# Patient Record
Sex: FEMALE | Race: WHITE
[De-identification: names, ages, dates, MRNs, and addresses within clinical notes are randomized per-mention and may not be internally consistent; named-entity substitution may affect disease eponyms.]

---

## 2018-04-14 ENCOUNTER — HOSPITAL ENCOUNTER (INPATIENT)
Dept: HOSPITAL 17 - NEPE | Age: 63
LOS: 2 days | Discharge: HOME | DRG: 419 | End: 2018-04-16
Attending: HOSPITALIST | Admitting: HOSPITALIST
Payer: COMMERCIAL

## 2018-04-14 VITALS — HEIGHT: 63 IN | BODY MASS INDEX: 33.59 KG/M2 | WEIGHT: 189.6 LBS

## 2018-04-14 VITALS — RESPIRATION RATE: 18 BRPM | HEART RATE: 81 BPM | OXYGEN SATURATION: 99 %

## 2018-04-14 VITALS
SYSTOLIC BLOOD PRESSURE: 121 MMHG | HEART RATE: 85 BPM | OXYGEN SATURATION: 99 % | TEMPERATURE: 98.5 F | DIASTOLIC BLOOD PRESSURE: 77 MMHG | RESPIRATION RATE: 20 BRPM

## 2018-04-14 DIAGNOSIS — R73.9: ICD-10-CM

## 2018-04-14 DIAGNOSIS — K80.12: Primary | ICD-10-CM

## 2018-04-14 DIAGNOSIS — F17.210: ICD-10-CM

## 2018-04-14 LAB
ALBUMIN SERPL-MCNC: 3.5 GM/DL (ref 3.4–5)
ALP SERPL-CCNC: 81 U/L (ref 45–117)
ALT SERPL-CCNC: 17 U/L (ref 10–53)
AST SERPL-CCNC: 20 U/L (ref 15–37)
BACTERIA #/AREA URNS HPF: (no result) /HPF
BASOPHILS # BLD AUTO: 0.1 TH/MM3 (ref 0–0.2)
BASOPHILS NFR BLD: 0.5 % (ref 0–2)
BILIRUB SERPL-MCNC: 0.4 MG/DL (ref 0.2–1)
BUN SERPL-MCNC: 9 MG/DL (ref 7–18)
CALCIUM SERPL-MCNC: 9.6 MG/DL (ref 8.5–10.1)
CHLORIDE SERPL-SCNC: 106 MEQ/L (ref 98–107)
COLOR UR: YELLOW
CREAT SERPL-MCNC: 0.89 MG/DL (ref 0.5–1)
EOSINOPHIL # BLD: 0.5 TH/MM3 (ref 0–0.4)
EOSINOPHIL NFR BLD: 5.3 % (ref 0–4)
ERYTHROCYTE [DISTWIDTH] IN BLOOD BY AUTOMATED COUNT: 14.5 % (ref 11.6–17.2)
GFR SERPLBLD BASED ON 1.73 SQ M-ARVRAT: 65 ML/MIN (ref 89–?)
GLUCOSE SERPL-MCNC: 95 MG/DL (ref 74–106)
GLUCOSE UR STRIP-MCNC: (no result) MG/DL
HCO3 BLD-SCNC: 24.7 MEQ/L (ref 21–32)
HCT VFR BLD CALC: 41.5 % (ref 35–46)
HGB BLD-MCNC: 14.3 GM/DL (ref 11.6–15.3)
HGB UR QL STRIP: (no result)
KETONES UR STRIP-MCNC: 10 MG/DL
LYMPHOCYTES # BLD AUTO: 2.2 TH/MM3 (ref 1–4.8)
LYMPHOCYTES NFR BLD AUTO: 21.4 % (ref 9–44)
MCH RBC QN AUTO: 30.6 PG (ref 27–34)
MCHC RBC AUTO-ENTMCNC: 34.6 % (ref 32–36)
MCV RBC AUTO: 88.5 FL (ref 80–100)
MONOCYTE #: 0.8 TH/MM3 (ref 0–0.9)
MONOCYTES NFR BLD: 8.1 % (ref 0–8)
MUCOUS THREADS #/AREA URNS LPF: (no result) /LPF
NEUTROPHILS # BLD AUTO: 6.7 TH/MM3 (ref 1.8–7.7)
NEUTROPHILS NFR BLD AUTO: 64.7 % (ref 16–70)
NITRITE UR QL STRIP: (no result)
PLATELET # BLD: 282 TH/MM3 (ref 150–450)
PMV BLD AUTO: 8.8 FL (ref 7–11)
PROT SERPL-MCNC: 7.7 GM/DL (ref 6.4–8.2)
RBC # BLD AUTO: 4.69 MIL/MM3 (ref 4–5.3)
SODIUM SERPL-SCNC: 138 MEQ/L (ref 136–145)
SP GR UR STRIP: 1.01 (ref 1–1.03)
SQUAMOUS #/AREA URNS HPF: 1 /HPF (ref 0–5)
TROPONIN I SERPL-MCNC: (no result) NG/ML (ref 0.02–0.05)
URINE LEUKOCYTE ESTERASE: (no result)
WBC # BLD AUTO: 10.3 TH/MM3 (ref 4–11)

## 2018-04-14 PROCEDURE — 85025 COMPLETE CBC W/AUTO DIFF WBC: CPT

## 2018-04-14 PROCEDURE — 76705 ECHO EXAM OF ABDOMEN: CPT

## 2018-04-14 PROCEDURE — 84484 ASSAY OF TROPONIN QUANT: CPT

## 2018-04-14 PROCEDURE — 82550 ASSAY OF CK (CPK): CPT

## 2018-04-14 PROCEDURE — 96375 TX/PRO/DX INJ NEW DRUG ADDON: CPT

## 2018-04-14 PROCEDURE — 83605 ASSAY OF LACTIC ACID: CPT

## 2018-04-14 PROCEDURE — 71275 CT ANGIOGRAPHY CHEST: CPT

## 2018-04-14 PROCEDURE — 96374 THER/PROPH/DIAG INJ IV PUSH: CPT

## 2018-04-14 PROCEDURE — 83690 ASSAY OF LIPASE: CPT

## 2018-04-14 PROCEDURE — 93005 ELECTROCARDIOGRAM TRACING: CPT

## 2018-04-14 PROCEDURE — 88304 TISSUE EXAM BY PATHOLOGIST: CPT

## 2018-04-14 PROCEDURE — 81001 URINALYSIS AUTO W/SCOPE: CPT

## 2018-04-14 PROCEDURE — 96361 HYDRATE IV INFUSION ADD-ON: CPT

## 2018-04-14 PROCEDURE — 71045 X-RAY EXAM CHEST 1 VIEW: CPT

## 2018-04-14 PROCEDURE — 74174 CTA ABD&PLVS W/CONTRAST: CPT

## 2018-04-14 PROCEDURE — 80053 COMPREHEN METABOLIC PANEL: CPT

## 2018-04-14 NOTE — RADRPT
EXAM DATE/TIME:  04/14/2018 20:50 

 

[facility] COMPARISON:     

No previous studies available for comparison.

 

                     

INDICATIONS :     

Lower chest pain and vomiting.

                     

 

MEDICAL HISTORY :     

None.          

 

SURGICAL HISTORY :     

None.   

 

ENCOUNTER:     

Initial                                        

 

ACUITY:     

3 days      

 

PAIN SCORE:     

10/10

 

LOCATION:     

Bilateral chest 

 

FINDINGS:     

A single view of the chest demonstrates the lungs to be symmetrically aerated without evidence of mas
s, infiltrate or effusion.  The cardiomediastinal contours are unremarkable.  Osseous structures are 
intact.

 

CONCLUSION:     

No evidence of acute cardiopulmonary disease.

 

 

 

 Sang Dick MD on April 14, 2018 at 21:07           

Board Certified Radiologist.

 This report was verified electronically.
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
## 2018-04-15 VITALS
TEMPERATURE: 98 F | HEART RATE: 64 BPM | SYSTOLIC BLOOD PRESSURE: 102 MMHG | OXYGEN SATURATION: 98 % | DIASTOLIC BLOOD PRESSURE: 63 MMHG | RESPIRATION RATE: 18 BRPM

## 2018-04-15 VITALS
TEMPERATURE: 97.6 F | DIASTOLIC BLOOD PRESSURE: 68 MMHG | SYSTOLIC BLOOD PRESSURE: 108 MMHG | HEART RATE: 76 BPM | RESPIRATION RATE: 20 BRPM | OXYGEN SATURATION: 98 %

## 2018-04-15 VITALS
RESPIRATION RATE: 17 BRPM | HEART RATE: 83 BPM | TEMPERATURE: 97.4 F | SYSTOLIC BLOOD PRESSURE: 124 MMHG | OXYGEN SATURATION: 96 % | DIASTOLIC BLOOD PRESSURE: 64 MMHG

## 2018-04-15 VITALS
OXYGEN SATURATION: 95 % | SYSTOLIC BLOOD PRESSURE: 115 MMHG | TEMPERATURE: 97.6 F | HEART RATE: 62 BPM | DIASTOLIC BLOOD PRESSURE: 59 MMHG | RESPIRATION RATE: 17 BRPM

## 2018-04-15 PROCEDURE — 0FT44ZZ RESECTION OF GALLBLADDER, PERCUTANEOUS ENDOSCOPIC APPROACH: ICD-10-PCS | Performed by: SURGERY

## 2018-04-15 RX ADMIN — HYDROCODONE BITARTRATE AND ACETAMINOPHEN PRN TAB: 5; 325 TABLET ORAL at 19:17

## 2018-04-15 RX ADMIN — TAZOBACTAM SODIUM AND PIPERACILLIN SODIUM SCH MLS/HR: 500; 4 INJECTION, SOLUTION INTRAVENOUS at 02:12

## 2018-04-15 RX ADMIN — OXYTOCIN SCH MLS/HR: 10 INJECTION, SOLUTION INTRAMUSCULAR; INTRAVENOUS at 08:33

## 2018-04-15 RX ADMIN — Medication SCH ML: at 08:30

## 2018-04-15 RX ADMIN — TAZOBACTAM SODIUM AND PIPERACILLIN SODIUM SCH MLS/HR: 500; 4 INJECTION, SOLUTION INTRAVENOUS at 19:16

## 2018-04-15 RX ADMIN — HYDROCODONE BITARTRATE AND ACETAMINOPHEN PRN TAB: 5; 325 TABLET ORAL at 23:09

## 2018-04-15 RX ADMIN — TAZOBACTAM SODIUM AND PIPERACILLIN SODIUM SCH MLS/HR: 500; 4 INJECTION, SOLUTION INTRAVENOUS at 14:48

## 2018-04-15 RX ADMIN — STANDARDIZED SENNA CONCENTRATE AND DOCUSATE SODIUM SCH TAB: 8.6; 5 TABLET, FILM COATED ORAL at 08:31

## 2018-04-15 RX ADMIN — ONDANSETRON PRN MG: 2 INJECTION, SOLUTION INTRAMUSCULAR; INTRAVENOUS at 08:34

## 2018-04-15 RX ADMIN — HYDROMORPHONE HYDROCHLORIDE PRN MG: 2 INJECTION INTRAMUSCULAR; INTRAVENOUS; SUBCUTANEOUS at 02:48

## 2018-04-15 RX ADMIN — HYDROMORPHONE HYDROCHLORIDE PRN MG: 2 INJECTION INTRAMUSCULAR; INTRAVENOUS; SUBCUTANEOUS at 08:33

## 2018-04-15 RX ADMIN — ONDANSETRON PRN MG: 2 INJECTION, SOLUTION INTRAMUSCULAR; INTRAVENOUS at 02:47

## 2018-04-15 RX ADMIN — Medication SCH ML: at 19:17

## 2018-04-15 RX ADMIN — TAZOBACTAM SODIUM AND PIPERACILLIN SODIUM SCH MLS/HR: 500; 4 INJECTION, SOLUTION INTRAVENOUS at 08:33

## 2018-04-15 RX ADMIN — ONDANSETRON PRN MG: 2 INJECTION, SOLUTION INTRAMUSCULAR; INTRAVENOUS at 16:19

## 2018-04-15 RX ADMIN — STANDARDIZED SENNA CONCENTRATE AND DOCUSATE SODIUM SCH TAB: 8.6; 5 TABLET, FILM COATED ORAL at 19:17

## 2018-04-15 RX ADMIN — HYDROMORPHONE HYDROCHLORIDE PRN MG: 2 INJECTION INTRAMUSCULAR; INTRAVENOUS; SUBCUTANEOUS at 16:19

## 2018-04-15 NOTE — HHI.PR
__________________________________________________





Immediate Post Op Note


Procedure Date:


Apr 15, 2018


Pre Op Diagnosis:  


acute cholecystitis, symptomatic cholelithiasis


Post Op Diagnosis:  


same


Surgeon:


Horacio Burt MD


Assistant(s):


see or sheet


Procedure:


lap adrián


Findings:


inflamed distended gallbladder with stones


Complications:


none


Specimen(s) removed:


gallbladder


Estimated blood loss:


5cc


Anesthesia:  General


Drains:  None


Patient to:  PACU


Patient Condition:  Good











Horacio Burt MD Apr 15, 2018 10:32

## 2018-04-15 NOTE — HHI.HP
__________________________________________________





HPI


Service


[facility]


Primary Care Physician


Jeremy Skinner D.O.


Admission Diagnosis





abd pain, cholecystitis


Diagnoses:  


(1) Cholecystitis


Diagnosis:  Principal





(2) Intractable pain


Diagnosis:  Principal





(3) Tobacco abuse


Diagnosis:  Principal





Travel History


International Travel<30 Days:  No


Contact w/Intl Traveler <30 Da:  No


Traveled to Known Affected Are:  No


History of Present Illness


This is a 62-year-old female with no significant PMH who presented to the ER 

with complaints of abdominal pain in addition to nausea and vomiting x4 days.  

States symptoms have gotten progressively worse, now w/ few episodes of 

diarrhea.  Reports pain initially intermittent, now constant, severe, 9/10, 

epigastric/RUQ, non-radiating.  No h/o similar symptoms.  Denies fever or 

chills.  On arrival, /77, HR 85, O2 sat 99% RA, Afebrile.  CBC 

unremarkable.  Chemistry essentially unremarkable except for GFR 65.  LFTs 

normal.  Troponin negative.  Lipase normal per UA negative.  CXR with no acute 

findings.  Gallbladder US with cholelithiasis, no evidence of cholecystitis or 

biliary obstruction.  CTA Aorta with no aneurysm, cholelithiasis with 

gallbladder wall thickening suspicious for acute cholecystitis, confluent 

mesenteric adenopathy of uncertain etiology, malignancy not excluded, hypodense 

liver compatible with fatty infiltration or hepatocellular disease.  S/p 

Morphine, Zofran and Dilaudid in ER w/ some improvement.





Review of Systems


Except as stated in HPI:  all other systems reviewed are Neg


ROS: 14 point review of systems otherwise negative.





Past Family Social History


Past Medical History


PMH: None


Past Surgical History


PAST SURGICAL HISTORY: Partial Hysterectomy, Left Femur


Allergies:  


Coded Allergies:  


     codeine (Unverified  Allergy, Intermediate, Itching, 18)


 N/V


Family History


PAST FAMILY HISTORY:  Reviewed.  No h/o DM or CAD


Social History


PAST SOCIAL HISTORY: Occasional alcohol.  Smokes 1ppd.  Negative for drugs.





Physical Exam


Vital Signs





Vital Signs








  Date Time  Temp Pulse Resp B/P (MAP) Pulse Ox O2 Delivery O2 Flow Rate FiO2


 


4/15/18 02:37 97.6 76 20 108/68 (81) 98   


 


18 21:24  81 18  99 Room Air  


 


18 19:04 98.5 85 20 121/77 (92) 99   








Physical Exam


PE:


GENERAL: Very pleasant middle-aged white female in no acute distress.


HEENT: PERRLA, EOMI. No scleral icterus or conjunctival pallor. No lid lag or 

facial droop.  


CARDIOVASCULAR: Regular rate and rhythm.  No obvious murmurs to auscultation. 

No chest tenderness to palpation. 


RESPIRATORY: No obvious rhonchi or wheezing. Clear to auscultation. Breath 

sounds equal bilaterally. 


GASTROINTESTINAL: Abdomen soft, tenderness to palpation RUQ/epigastric region, 

nondistended. BS normal. 


MUSCULOSKELETAL: Extremities without clubbing, cyanosis, or edema. No obvious 

deformities. 


NEUROLOGICAL: Awake, alert and oriented x4. No focal neurologic deficits. 

Moving both upper and lower extremities spontaneously.


Laboratory





Laboratory Tests








Test


  18


20:12 18


21:15


 


Urine Color YELLOW  


 


Urine Turbidity CLEAR  


 


Urine pH 6.0  


 


Urine Specific Gravity 1.009  


 


Urine Protein NEG  


 


Urine Glucose (UA) NEG  


 


Urine Ketones 10  


 


Urine Occult Blood SMALL  


 


Urine Nitrite NEG  


 


Urine Bilirubin NEG  


 


Urine Urobilinogen LESS THAN 2.0  


 


Urine Leukocyte Esterase NEG  


 


Urine RBC 4  


 


Urine WBC LESS THAN 1  


 


Urine Squamous Epithelial


Cells 1 


  


 


 


Urine Bacteria OCC  


 


Urine Mucus FEW  


 


Microscopic Urinalysis Comment


  CULT NOT


INDICATED 


 


 


White Blood Count  10.3 


 


Red Blood Count  4.69 


 


Hemoglobin  14.3 


 


Hematocrit  41.5 


 


Mean Corpuscular Volume  88.5 


 


Mean Corpuscular Hemoglobin  30.6 


 


Mean Corpuscular Hemoglobin


Concent 


  34.6 


 


 


Red Cell Distribution Width  14.5 


 


Platelet Count  282 


 


Mean Platelet Volume  8.8 


 


Neutrophils (%) (Auto)  64.7 


 


Lymphocytes (%) (Auto)  21.4 


 


Monocytes (%) (Auto)  8.1 


 


Eosinophils (%) (Auto)  5.3 


 


Basophils (%) (Auto)  0.5 


 


Neutrophils # (Auto)  6.7 


 


Lymphocytes # (Auto)  2.2 


 


Monocytes # (Auto)  0.8 


 


Eosinophils # (Auto)  0.5 


 


Basophils # (Auto)  0.1 


 


CBC Comment  DIFF FINAL 


 


Differential Comment   


 


Blood Urea Nitrogen  9 


 


Creatinine  0.89 


 


Random Glucose  95 


 


Total Protein  7.7 


 


Albumin  3.5 


 


Calcium Level  9.6 


 


Alkaline Phosphatase  81 


 


Aspartate Amino Transf


(AST/SGOT) 


  20 


 


 


Alanine Aminotransferase


(ALT/SGPT) 


  17 


 


 


Total Bilirubin  0.4 


 


Sodium Level  138 


 


Potassium Level  4.0 


 


Chloride Level  106 


 


Carbon Dioxide Level  24.7 


 


Anion Gap  7 


 


Estimat Glomerular Filtration


Rate 


  65 


 


 


Lactic Acid Level  1.2 


 


Total Creatine Kinase  67 


 


Troponin I  LESS THAN 0.02 


 


Lipase  178 








Result Diagram:  


18








Caprini VTE Risk Assessment


Caprini VTE Risk Assessment:  No/Low Risk (score <= 1)


Caprini Risk Assessment Model











 Point Value = 1          Point Value = 2  Point Value = 3  Point Value = 5


 


Age 41-60


Minor surgery


BMI > 25 kg/m2


Swollen legs


Varicose veins


Pregnancy or postpartum


History of unexplained or recurrent


   spontaneous 


Oral contraceptives or hormone


   replacement


Sepsis (< 1 month)


Serious lung disease, including


   pneumonia (< 1 month)


Abnormal pulmonary function


Acute myocardial infarction


Congestive heart failure (< 1 month)


History of inflammatory bowel disease


Medical patient at bed rest Age 61-74


Arthroscopic surgery


Major open surgery (> 45 min)


Laparoscopic surgery (> 45 min)


Malignancy


Confined to bed (> 72 hours)


Immobilizing plaster cast


Central venous access Age >= 75


History of VTE


Family history of VTE


Factor V Leiden


Prothrombin [id number]


Lupus anticoagulant


Anticardiolipin antibodies


Elevated serum homocysteine


Heparin-induced thrombocytopenia


Other congenital or acquired


   thrombophilia Stroke (< 1 month)


Elective arthroplasty


Hip, pelvis, or leg fracture


Acute spinal cord injury (< 1 month)








Prophylaxis Regimen











   Total Risk


Factor Score Risk Level Prophylaxis Regimen


 


0-1      Low Early ambulation


 


2 Moderate Order ONE of the following:


*Sequential Compression Device (SCD)


*Heparin 5000 units SQ BID


 


3-4 Higher Order ONE of the following medications:


*Heparin 5000 units SQ TID


*Enoxaparin/Lovenox 40 mg SQ daily (WT < 150 kg, CrCl > 30 mL/min)


*Enoxaparin/Lovenox 30 mg SQ daily (WT < 150 kg, CrCl > 10-29 mL/min)


*Enoxaparin/Lovenox 30 mg SQ BID (WT < 150 kg, CrCl > 30 mL/min)


AND/OR


*Sequential Compression Device (SCD)


 


5 or more Highest Order ONE of the following medications:


*Heparin 5000 units SQ TID (Preferred with Epidurals)


*Enoxaparin/Lovenox 40 mg SQ daily (WT < 150 kg, CrCl > 30 mL/min)


*Enoxaparin/Lovenox 30 mg SQ daily (WT < 150 kg, CrCl > 10-29 mL/min)


*Enoxaparin/Lovenox 30 mg SQ BID (WT < 150 kg, CrCl > 30 mL/min)


AND


*Sequential Compression Device (SCD)











Assessment and Plan


Problem List:  


(1) Cholecystitis


ICD Code:  K81.9 - Cholecystitis, unspecified


(2) Intractable pain


ICD Code:  R52 - Pain, unspecified


(3) Tobacco abuse


ICD Code:  Z72.0 - Tobacco use


Assessment and Plan


A/P:


1.  Cholecystitis:  acute onset of abdominal pain, nausea/vomiting w/ 

progression, Gallbladder US w/ cholelithiasis, CTA Aorta w/ cholelithiasis and 

gallbladder wall thickening consistent w/ acute cholecystitis and mesenteric 

adenopathy.  Will Consult Gen Sx for further evaluation, clear liquids, IVF, 

analgesics/antiemetics. 


2.  Intractable Pain:  secondary to above, s/p Morphin in ER w/ minimal 

improvement, will switch to Dilaudid IV, antiemetics as needed, more 

comfortable now, not requesting additional medication. 


3.  Tobacco Abuse:  Pt counselled, NicoDerm prn if needed


4.  DVT Prophylaxis:  SCD/Teds


5.  Social work for d/c planning as needed. 


6.  Case discussed w/ ER physician at length, labs/records/imaging reviewed by 

me.





Physician Certification


2 Midnight Certification Type:  Admission for Inpatient Services


Order for Inpatient Services


The services are ordered in accordance with Medicare regulations or non-

Medicare payer requirements, as applicable.  In the case of services not 

specified as inpatient-only, they are appropriately provided as inpatient 

services in accordance with the 2-midnight benchmark.


Estimated LOS (days):  2


 days is the estimated time the patient will need to remain in the hospital, 

assuming treatment plan goals are met and no additional complications.


Post-Hospital Plan:  Not yet determined











Meron Quintanilla MD Apr 15, 2018 03:12

## 2018-04-15 NOTE — MP
cc:

Horacio Burt MD

****

 

 

DATE OF OPERATION:

04/15/2018

 

PREOPERATIVE DIAGNOSIS:

Acute cholecystitis with cholelithiasis.

 

POSTOPERATIVE DIAGNOSIS:

Acute cholecystitis with cholelithiasis.

 

PROCEDURE PERFORMED:

Laparoscopic cholecystectomy.

 

SURGEON:

Horacio Burt MD

 

ASSISTANT:

ALVINA

 

ANESTHESIA:

GETA.

 

INTRAVENOUS FLUIDS:

See anesthesia sheet.

 

ESTIMATED BLOOD LOSS:

15 mL

 

DRAINS:

None.

 

COMPLICATIONS:

None.

 

WOUND CLASSIFICATION:

Clean/contaminated.

 

SPECIMENS:

Gallbladder.

 

FINDINGS:

Inflamed, distended gallbladder with multiple gallstones.

 

INDICATION:

The patient a 62-year-old female who presented with acute onset of 

right upper quadrant epigastric abdominal pain.  She had further 

workup including ultrasound showing multiple gallstones.  Decision was

made for laparoscopic cholecystectomy.

 

DESCRIPTION OF PROCEDURE:

The patient was taken to the operating suite, placed in supine 

position.  She was prepped and draped in usual sterile fashion after 

induction of general endotracheal anesthesia.  Brief timeout done 

stating correct patient, procedure, surgical site.  We were all in 

agreement with this.  Attention first directed to the umbilicus where 

local anesthetic injected.  Small stab nick incision was made.  A 5 mm

port was done with Visiport and entered into the abdomen safely.  On 

cursory inspection, no evidence of injury.  Three other ports placed, 

including a 12 mm epigastric port, followed by two 5 mm right 

subcostal ports.  The patient placed in reverse Trendelenburg, 

airplaned left.  On observation, there was noted to be some adhesions 

around the gallbladder and gallbladder noted to be acutely inflamed 

and thickened wall.  The adhesions were taken down.  The gallbladder 

fundus was grasped and retracted cephalad.  The hook Bovie 

electrocautery was used to dissect out the gallbladder along with a 

[place] grasper.  The gallbladder, cystic duct and cystic artery were

dissected out in the usual manner, 2 clips placed proximal on the 

cystic artery and 1 distal.  Small side branch, single clip placed.  

EndoShears used to transect this.  Cystic duct, 3 clips were placed 

proximal and 1 clip was placed distal.  The EndoShears used to 

transect the cystic duct.  Gallbladder was removed from the 

gallbladder fossa with hook Bovie electrocautery and placed in the 

EndoCatch bag.  Prior to removal, actually, the gallbladder was 

decompressed with an EndoNeedle due to the thickened induration and 

difficulty in grasping the gallbladder.  Suction irrigation done until

effluent was clear.  Hook Bovie electrocautery used in the gallbladder

fossa.  Small little bleeding controlled with hook Bovie 

electrocautery and small piece of Surgicel was placed in the 

gallbladder fossa bed.  Following this, hemostasis was noted and no 

biliary leakage.  The patient flattened out, pneumoperitoneum removed,

the ports were removed.  The epigastric port was closed with 0 Vicryl,

4-0 Monocryl use all port sites.  Local anesthetic injected.  Sterile 

dressings including Steri-Strips and Mastisol placed.  The patient 

tolerated procedure well.  No intraoperative complications.  All lap 

and instrument counts were correct at the end of the procedure.

 

 

__________________________________

MD PHUONG Frankel/DANIA

D: 04/15/2018, 12:15 PM

T: 04/15/2018, 12:36 PM

Visit #: [id number]

Job #: [id number]

## 2018-04-15 NOTE — RADRPT
EXAM DATE/TIME:  04/14/2018 23:39 

 

[facility] COMPARISON:     

No previous studies available for comparison.

 

 

INDICATIONS :     

Abdominal pain; rule out aortic aneurysm.

                           

 

IV CONTRAST:     

97 cc Omnipaque 350 (iohexol) IV 

 

 

RADIATION DOSE:     

20.48 CTDIvol (mGy) 

 

 

MEDICAL HISTORY :     

None  

 

SURGICAL HISTORY :      

Hysterectomy. 

 

ENCOUNTER:      

Initial

 

ACUITY:      

1 day

 

PAIN SCALE:      

7/10

 

LOCATION:         

abdomen

 

TECHNIQUE:     

Volumetric scanning was performed using a multi-row detector CT scanner.  The data was post processed
 with a variety of visualization algorithms including full volume maximum intensity projection, multi
-planar sliding thin slab reformation, curved planar reformation, and surface rendering techniques.  
Using automated exposure control and adjustment of the mA and/or kV according to patient size, radiat
ion dose was kept as low as reasonably achievable to obtain optimal diagnostic quality images.  DICOM
 format image data is available electronically for review and comparison.  

 

FINDINGS:     

The lungs are free of acute parenchymal opacity. No effusions are identified. Examination of the medi
astinum demonstrates no abnormally enlarged lymph nodes by CT criteria. No axillary or hilar abnormal
ities are identified. Coronary artery calcifications are present.

 

There is decreased density of the liver with respect to the spleen compatible with fatty infiltration
. The spleen is unremarkable. The spleen is normal in size and free of focal defects. There is gallbl
adder wall thickening with stones present and trace amount of pericholecystic fluid. There abnormal l
eft paratracheal lymph nodes in aggregate measuring 3 cm in diameter. Examination of the pelvis demon
strates no evidence of free fluid or pelvic mass. No abnormally enlarged inguinal or retroperitoneal 
lymph nodes are present. The bladder is unremarkable.

 

Examination of the aortic arch demonstrates no evidence of aneurysm or dissection.  The descending th
oracic aorta is unremarkable and the aortic root is normal in size. There is direct origin of the lef
t vertebral artery from the arch which can be seen as a normal variation. The aorta is normal in leah
thuan. There is no evidence of aneurysm or dissection. The renal artery origins are patent bilaterally.
 The celiac axis and superior mesenteric artery origins are also patent.

 

CONCLUSION:     

1. No evidence of aneurysm.

2. Cholelithiasis with gallbladder wall thickening suspicious for acute cholecystitis.

3. [place] mesenteric adenopathy of uncertain etiology. Malignancy is not excluded. PET/CT scan is
 recommended to further evaluation if clinically indicated.

4. Hypodense liver compatible with fatty infiltration or hepatocellular disease.

 

 

 

 Agustín Del Castillo MD on April 15, 2018 at 0:22           

Board Certified Radiologist.

 This report was verified electronically.

## 2018-04-15 NOTE — HHI.PR
Addendum To [facility] Progress Not


Reason for addendum:  Additonal documentation (Discussed with surgery, status 

post laparoscopic cholecystectomy secondary to cholecystitis and gallstones.  

Recommending continue IV Zosyn.Abnormal imaging regarding liver outpatient 

follow-up)











Carlos Choi MD Apr 15, 2018 13:48
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
## 2018-04-15 NOTE — EKG
Date Performed: 2018       Time Performed: 19:30:58

 

PTAGE:      62 years

 

EKG:      Sinus rhythm 

 

 NORMAL ECG Since 

 

 PREVIOUS TRACING            , no significant change noted PREVIOUS TRACIN2014 06.42.04

 

DOCTOR:   Masha Kidd  Interpretating Date/Time  2018 12:59:15

## 2018-04-15 NOTE — PD
Physical Exam


Date Seen by Provider:  Apr 15, 2018


Narrative


This is a patient who was initially seen by Zechariah Rachel PA-C for severe upper 

abdominal pain.  Please see his note for full details of the H&P.





This patient's pain has been difficult to control.  However, she is now 

comfortable.





Data


Data


Last Documented VS





Vital Signs








  Date Time  Temp Pulse Resp B/P (MAP) Pulse Ox O2 Delivery O2 Flow Rate FiO2


 


4/14/18 21:24  81 18  99 Room Air  


 


4/14/18 19:04 98.5   121/77 (92)    








Orders





 Orders


Complete Blood Count With Diff (4/14/18 19:12)


Comprehensive Metabolic Panel (4/14/18 19:12)


Urinalysis - C+S If Indicated (4/14/18 19:12)


Iv Access Insert/Monitor (4/14/18 19:12)


Oxygen Administration (4/14/18 19:12)


Oximetry (4/14/18 19:12)


Lipase (4/14/18 19:12)


Electrocardiogram (4/14/18 )


Lactic Acid Sepsis Protocol (4/14/18 20:43)


Ecg Monitoring (4/14/18 20:43)


Morphine Inj (Morphine Inj) (4/14/18 20:45)


Ondansetron Inj (Zofran Inj) (4/14/18 20:45)


Sodium Chlor 0.9% 1000 Ml Inj (Ns 1000 M (4/14/18 20:43)


Electrocardiogram (4/14/18 20:43)


Famotidine Inj (Pepcid Inj) (4/14/18 20:45)


Troponin I (4/14/18 20:43)


Ckmb (Isoenzyme) Profile (4/14/18 20:43)


Chest, Single Ap (4/14/18 )


Us Abdomen Gallbladder (4/14/18 )


Hydromorphone Pf Inj (Dilaudid Pf Inj) (4/14/18 22:15)


Metoclopramide Inj (Reglan Inj) (4/14/18 22:15)


Cta Thor Abd Aorta W Iv C W3d (4/14/18 )


Iohexol 350 Inj (Omnipaque 350 Inj) (4/14/18 23:54)


Admit Order (Ed Use Only) (4/15/18 )


Vital Signs (Adult) Q4H (4/15/18 01:27)


Activity Oob With Assistance (4/15/18 01:27)


Notify Dr: Other (4/15/18 01:27)


Piperacil-Tazo 4.5 Gm Premix (Zosyn 4.5 (4/15/18 01:30)


Consult General Surgery (4/15/18 )


Admit To Inpatient (4/15/18 )


Vital Signs (Adult) Q4H (4/15/18 01:27)


Activity Oob Ad Jocelyne (4/15/18 01:27)


Intake + Output THAIS.QSHIFT (4/15/18 01:27)


Diet Clear Liquid (4/15/18 Breakfast)


Sodium Chlor 0.9% 1000 Ml Inj (Ns 1000 M (4/15/18 01:27)


Sodium Chloride 0.9% Flush (Ns Flush) (4/15/18 01:30)


Sodium Chloride 0.9% Flush (Ns Flush) (4/15/18 09:00)


Ondansetron Inj (Zofran Inj) (4/15/18 01:30)


Comprehensive Metabolic Panel (4/16/18 06:00)


Complete Blood Count With Diff (4/16/18 06:00)


Scd Bilateral/Knee High THAIS.BID (4/15/18 01:27)


Tejas Bilateral/Knee High THAIS.QSHIFT (4/15/18 01:29)


Acetaminophen (Tylenol) (4/15/18 01:30)


Morphine Inj (Morphine Inj) (4/15/18 01:30)


Docusate Sodium-Senna (Yara-Colace) (4/15/18 09:00)


Magnesium Hydroxide Liq (Milk Of Magnesi (4/15/18 01:30)


Sennosides (Senokot) (4/15/18 01:30)


Bisacodyl Supp (Dulcolax Supp) (4/15/18 01:30)


Lactulose Liq (Lactulose Liq) (4/15/18 01:30)


Inpatient Certification (4/15/18 )





Labs





Laboratory Tests








Test


  4/14/18


20:12 4/14/18


21:15


 


Urine Color YELLOW  


 


Urine Turbidity CLEAR  


 


Urine pH 6.0  


 


Urine Specific Gravity 1.009  


 


Urine Protein NEG mg/dL  


 


Urine Glucose (UA) NEG mg/dL  


 


Urine Ketones 10 mg/dL  


 


Urine Occult Blood SMALL  


 


Urine Nitrite NEG  


 


Urine Bilirubin NEG  


 


Urine Urobilinogen


  LESS THAN 2.0


MG/DL 


 


 


Urine Leukocyte Esterase NEG  


 


Urine RBC 4 /hpf  


 


Urine WBC


  LESS THAN 1


/hpf 


 


 


Urine Squamous Epithelial


Cells 1 /hpf 


  


 


 


Urine Bacteria OCC /hpf  


 


Urine Mucus FEW /lpf  


 


Microscopic Urinalysis Comment


  CULT NOT


INDICATED 


 


 


White Blood Count  10.3 TH/MM3 


 


Red Blood Count  4.69 MIL/MM3 


 


Hemoglobin  14.3 GM/DL 


 


Hematocrit  41.5 % 


 


Mean Corpuscular Volume  88.5 FL 


 


Mean Corpuscular Hemoglobin  30.6 PG 


 


Mean Corpuscular Hemoglobin


Concent 


  34.6 % 


 


 


Red Cell Distribution Width  14.5 % 


 


Platelet Count  282 TH/MM3 


 


Mean Platelet Volume  8.8 FL 


 


Neutrophils (%) (Auto)  64.7 % 


 


Lymphocytes (%) (Auto)  21.4 % 


 


Monocytes (%) (Auto)  8.1 % 


 


Eosinophils (%) (Auto)  5.3 % 


 


Basophils (%) (Auto)  0.5 % 


 


Neutrophils # (Auto)  6.7 TH/MM3 


 


Lymphocytes # (Auto)  2.2 TH/MM3 


 


Monocytes # (Auto)  0.8 TH/MM3 


 


Eosinophils # (Auto)  0.5 TH/MM3 


 


Basophils # (Auto)  0.1 TH/MM3 


 


CBC Comment  DIFF FINAL 


 


Differential Comment   


 


Blood Urea Nitrogen  9 MG/DL 


 


Creatinine  0.89 MG/DL 


 


Random Glucose  95 MG/DL 


 


Total Protein  7.7 GM/DL 


 


Albumin  3.5 GM/DL 


 


Calcium Level  9.6 MG/DL 


 


Alkaline Phosphatase  81 U/L 


 


Aspartate Amino Transf


(AST/SGOT) 


  20 U/L 


 


 


Alanine Aminotransferase


(ALT/SGPT) 


  17 U/L 


 


 


Total Bilirubin  0.4 MG/DL 


 


Sodium Level  138 MEQ/L 


 


Potassium Level  4.0 MEQ/L 


 


Chloride Level  106 MEQ/L 


 


Carbon Dioxide Level  24.7 MEQ/L 


 


Anion Gap  7 MEQ/L 


 


Estimat Glomerular Filtration


Rate 


  65 ML/MIN 


 


 


Lactic Acid Level  1.2 mmol/L 


 


Total Creatine Kinase  67 U/L 


 


Troponin I


  


  LESS THAN 0.02


NG/ML


 


Lipase  178 U/L 











MDM


Supervised Visit with LESTER:  Yes


Narrative Course


I, Dr. Valerio, have reviewed the advance practice practitioner's documentation 

and am in agreement, met with the patient face to face, made the diagnosis, and 

the medical decision making was done by me.  





*My assessment and Findings: Patient was initially in significant pain.  She is 

now comfortable.








CBC & BMP Diagram


4/14/18 21:15








Total Protein 7.7, Albumin 3.5, Calcium Level 9.6, Alkaline Phosphatase 81, 

Aspartate Amino Transf (AST/SGOT) 20, Alanine Aminotransferase (ALT/SGPT) 17, 

Total Bilirubin 0.4, Lipase 178





trop < 0.02








Last Impressions








Gall Bladder Ultrasound 4/14/18 0000 Signed





Impressions: 





 Service Date/Time:  Saturday, April 14, 2018 21:26 - CONCLUSION:  1. 





 Cholelithiasis without evidence of cholecystitis or biliary obstruction. 2. 





 Suspected small right pleural effusion.     Sang Dick MD 


 


Chest X-Ray 4/14/18 0000 Signed





Impressions: 





 Service Date/Time:  Saturday, April 14, 2018 20:50 - CONCLUSION:  No evidence 

of 





 acute cardiopulmonary disease.     Sang Dick MD 








CTA:


1. No evidence of aneurysm.


2. Cholelithiasis with gallbladder wall thickening suspicious for acute 

cholecystitis.


3. [place] mesenteric adenopathy of uncertain etiology. Malignancy is not 

excluded. PET/CT scan is recommended to further evaluation if clinically 

indicated.


4. Hypodense liver compatible with fatty infiltration or hepatocellular disease.





Disposition has been discussed with the patient and her daughter.  She is 

amenable to admission to the hospital for pain control and probable definitive 

treatment of her cholelithiasis/? cholecystitis


Diagnosis





 Primary Impression:  


 Abdominal pain


 Qualified Codes:  R10.13 - Epigastric pain


 Additional Impression:  


 Cholelithiasis


 Qualified Codes:  K80.00 - Calculus of gallbladder with acute cholecystitis 

without obstruction





Admitting Information


Admitting Physician Requests:  Admit


Condition:  Stable











Malou Valerio MD Apr 15, 2018 01:37

## 2018-04-16 VITALS
DIASTOLIC BLOOD PRESSURE: 69 MMHG | TEMPERATURE: 98.4 F | HEART RATE: 59 BPM | RESPIRATION RATE: 17 BRPM | OXYGEN SATURATION: 96 % | SYSTOLIC BLOOD PRESSURE: 135 MMHG

## 2018-04-16 LAB
ALBUMIN SERPL-MCNC: 2.6 GM/DL (ref 3.4–5)
ALP SERPL-CCNC: 121 U/L (ref 45–117)
ALT SERPL-CCNC: 321 U/L (ref 10–53)
AST SERPL-CCNC: 252 U/L (ref 15–37)
BASOPHILS # BLD AUTO: 0 TH/MM3 (ref 0–0.2)
BASOPHILS NFR BLD: 0.1 % (ref 0–2)
BILIRUB SERPL-MCNC: 0.6 MG/DL (ref 0.2–1)
BUN SERPL-MCNC: 7 MG/DL (ref 7–18)
CALCIUM SERPL-MCNC: 8.7 MG/DL (ref 8.5–10.1)
CHLORIDE SERPL-SCNC: 106 MEQ/L (ref 98–107)
CREAT SERPL-MCNC: 0.92 MG/DL (ref 0.5–1)
EOSINOPHIL # BLD: 0 TH/MM3 (ref 0–0.4)
EOSINOPHIL NFR BLD: 0 % (ref 0–4)
ERYTHROCYTE [DISTWIDTH] IN BLOOD BY AUTOMATED COUNT: 14.9 % (ref 11.6–17.2)
GFR SERPLBLD BASED ON 1.73 SQ M-ARVRAT: 62 ML/MIN (ref 89–?)
GLUCOSE SERPL-MCNC: 116 MG/DL (ref 74–106)
HCO3 BLD-SCNC: 23.8 MEQ/L (ref 21–32)
HCT VFR BLD CALC: 32 % (ref 35–46)
HGB BLD-MCNC: 10.9 GM/DL (ref 11.6–15.3)
LYMPHOCYTES # BLD AUTO: 0.8 TH/MM3 (ref 1–4.8)
LYMPHOCYTES NFR BLD AUTO: 8.9 % (ref 9–44)
MCH RBC QN AUTO: 30.9 PG (ref 27–34)
MCHC RBC AUTO-ENTMCNC: 34.1 % (ref 32–36)
MCV RBC AUTO: 90.5 FL (ref 80–100)
MONOCYTE #: 0.6 TH/MM3 (ref 0–0.9)
MONOCYTES NFR BLD: 5.9 % (ref 0–8)
NEUTROPHILS # BLD AUTO: 8 TH/MM3 (ref 1.8–7.7)
NEUTROPHILS NFR BLD AUTO: 85.1 % (ref 16–70)
PLATELET # BLD: 205 TH/MM3 (ref 150–450)
PMV BLD AUTO: 9.2 FL (ref 7–11)
PROT SERPL-MCNC: 6 GM/DL (ref 6.4–8.2)
RBC # BLD AUTO: 3.54 MIL/MM3 (ref 4–5.3)
SODIUM SERPL-SCNC: 140 MEQ/L (ref 136–145)
WBC # BLD AUTO: 9.4 TH/MM3 (ref 4–11)

## 2018-04-16 RX ADMIN — OXYTOCIN SCH MLS/HR: 10 INJECTION, SOLUTION INTRAMUSCULAR; INTRAVENOUS at 07:50

## 2018-04-16 RX ADMIN — TAZOBACTAM SODIUM AND PIPERACILLIN SODIUM SCH MLS/HR: 500; 4 INJECTION, SOLUTION INTRAVENOUS at 09:04

## 2018-04-16 RX ADMIN — TAZOBACTAM SODIUM AND PIPERACILLIN SODIUM SCH MLS/HR: 500; 4 INJECTION, SOLUTION INTRAVENOUS at 01:52

## 2018-04-16 RX ADMIN — Medication SCH ML: at 07:44

## 2018-04-16 RX ADMIN — STANDARDIZED SENNA CONCENTRATE AND DOCUSATE SODIUM SCH TAB: 8.6; 5 TABLET, FILM COATED ORAL at 09:04

## 2018-04-16 NOTE — HHI.PR
__________________________________________________





Subjective


Subjective Notes


Resting in bed 


No issues


Pain controlled





Objective


Vitals/I&O





Vital Signs








  Date Time  Temp Pulse Resp B/P (MAP) Pulse Ox O2 Delivery O2 Flow Rate FiO2


 


4/16/18 00:00 98.4 59 17 135/69 (91) 96   


 


4/15/18 12:45      Nasal Cannula 3 








Labs





Laboratory Tests








Test


  4/16/18


05:25


 


White Blood Count 9.4 


 


Red Blood Count 3.54 


 


Hemoglobin 10.9 


 


Hematocrit 32.0 


 


Mean Corpuscular Volume 90.5 


 


Mean Corpuscular Hemoglobin 30.9 


 


Mean Corpuscular Hemoglobin


Concent 34.1 


 


 


Red Cell Distribution Width 14.9 


 


Platelet Count 205 


 


Mean Platelet Volume 9.2 


 


Neutrophils (%) (Auto) 85.1 


 


Lymphocytes (%) (Auto) 8.9 


 


Monocytes (%) (Auto) 5.9 


 


Eosinophils (%) (Auto) 0.0 


 


Basophils (%) (Auto) 0.1 


 


Neutrophils # (Auto) 8.0 


 


Lymphocytes # (Auto) 0.8 


 


Monocytes # (Auto) 0.6 


 


Eosinophils # (Auto) 0.0 


 


Basophils # (Auto) 0.0 


 


CBC Comment DIFF FINAL 


 


Differential Comment  


 


Blood Urea Nitrogen 7 


 


Creatinine 0.92 


 


Random Glucose 116 


 


Total Protein 6.0 


 


Albumin 2.6 


 


Calcium Level 8.7 


 


Alkaline Phosphatase 121 


 


Aspartate Amino Transf


(AST/SGOT) 252 


 


 


Alanine Aminotransferase


(ALT/SGPT) 321 


 


 


Total Bilirubin 0.6 


 


Sodium Level 140 


 


Potassium Level 4.3 


 


Chloride Level 106 


 


Carbon Dioxide Level 23.8 


 


Anion Gap 10 


 


Estimat Glomerular Filtration


Rate 62 


 








Cardiovascular:  Regular


Lungs:  Clear


Abdomen:  Other (lap sites c/d/i ), Post-op tenderness


Extremities:  No edema





A/P


Assessment and Plan


62 year old female POD1 lap adrián





-Tolerated regular diet


-Pain controlled


-Okay to shower tomorrow; pat incisions dry 


-No bathtubs, swimming pools or beach 


-Follow up next Monday; daughter will call office to make appointment


-GS clear for Tana KuP/[facility] ARNP Apr 16, 2018 10:31

## 2018-04-16 NOTE — HHI.PR
Subjective


Remarks


Follow-up laparoscopic cholecystectomy.  She is doing okay discussed with 

general surgery patient can be discharged home.  Seen with daughter discussed 

with nursing





Objective


Vitals





Vital Signs








  Date Time  Temp Pulse Resp B/P (MAP) Pulse Ox O2 Delivery O2 Flow Rate FiO2


 


4/16/18 00:00 98.4 59 17 135/69 (91) 96   


 


4/15/18 20:00 98.0 64 18 102/63 (76) 98   


 


4/15/18 16:00 97.6 62 17 115/59 (77) 95   


 


4/15/18 12:45 98.8 74 18 120/58 (78) 95 Nasal Cannula 3 


 


4/15/18 12:30  62 16 122/55 (77) 97   


 


4/15/18 12:15  65 16 123/58 (79) 98   


 


4/15/18 12:00  79 11 131/63 (85) 97   


 


4/15/18 11:45  99 20 137/68 (91) 97 Nasal Cannula 2 


 


4/15/18 11:44 98.8 98 13 132/60 (84) 99 Nasal Cannula 2 














I/O      


 


 4/15/18 4/15/18 4/15/18 4/16/18 4/16/18 4/16/18





 07:00 15:00 23:00 07:00 15:00 23:00


 


Intake Total 1480 ml 1800 ml 560 ml 500 ml  


 


Output Total  15 ml    


 


Balance 1480 ml 1785 ml 560 ml 500 ml  


 


      


 


Intake Oral 380 ml  360 ml 400 ml  


 


IV Total 1100 ml 1000 ml 200 ml 100 ml  


 


Other  800 ml    


 


Output Estimated Blood Loss  15 ml    


 


# Voids 2  3 3  


 


# Bowel Movements   0   








Result Diagram:  


4/16/18 0525                                                                   

             4/16/18 0525





Imaging





Last Impressions








Gall Bladder Ultrasound 4/14/18 0000 Signed





Impressions: 





 Service Date/Time:  Saturday, April 14, 2018 21:26 - CONCLUSION:  1. 





 Cholelithiasis without evidence of cholecystitis or biliary obstruction. 2. 





 Suspected small right pleural effusion.     Sang Dick MD 


 


Chest X-Ray 4/14/18 0000 Signed





Impressions: 





 Service Date/Time:  Saturday, April 14, 2018 20:50 - CONCLUSION:  No evidence 

of 





 acute cardiopulmonary disease.     Sang Dick MD 


 


Aorta CTA 4/14/18 0000 Signed





Impressions: 





 Service Date/Time:  Saturday, April 14, 2018 23:39 - CONCLUSION:  1. No 

evidence 





 of aneurysm. 2. Cholelithiasis with gallbladder wall thickening suspicious for 





 acute cholecystitis. 3. [place] mesenteric adenopathy of uncertain 

etiology. 





 Malignancy is not excluded. PET/CT scan is recommended to further evaluation 

if 





 clinically indicated. 4. Hypodense liver compatible with fatty infiltration or 





 hepatocellular disease.     Agustín Del Castillo MD 








Objective Remarks


GENERAL: Very pleasant middle-aged white female in no acute distress.


HEENT: PERRLA, EOMI. No scleral icterus or conjunctival pallor. No lid lag or 

facial droop.  


CARDIOVASCULAR: Regular rate and rhythm.  No obvious murmurs to auscultation. 

No chest tenderness to palpation. 


RESPIRATORY: No obvious rhonchi or wheezing. Clear to auscultation. Breath 

sounds equal bilaterally. 


GASTROINTESTINAL: Abdomen soft, tenderness to palpation RUQ/epigastric region 

over the operative site, nondistended. BS normal. 


MUSCULOSKELETAL: Extremities without clubbing, cyanosis, or edema. No obvious 

deformities. 


NEUROLOGICAL: Awake, alert and oriented x4. No focal neurologic deficits. 

Moving both upper and lower extremities spontaneously.


Procedures


Laparoscopic cholecystectomy





A/P


Problem List:  


(1) Cholecystitis


ICD Code:  K81.9 - Cholecystitis, unspecified


(2) Intractable pain


ICD Code:  R52 - Pain, unspecified


(3) Tobacco abuse


ICD Code:  Z72.0 - Tobacco use


Assessment and Plan


1.  Cholecystitis and cholelithiasis status post laparoscopic surgery.  She is 

doing well continue pain management with oxycodone status post Zosyn cleared 

for discharge by general surgery tolerating diet.  Patient counseled regarding 

narcotic use


2.  Transaminitis.  Discussed with surgery likely related to procedure.  

Patient with normal bilirubin. Rpt LFTS in 2 weeks. LFTS wNL prior to sx. CT 

shows hypodense liver differential diagnosis  of hepatocellular disease or 

fatty infiltration.  Outpatient follow-up.  Also has mesenteric adenopathy 

radiology recommends outpatient PET scan.  Again discussed with surgery likely 

related to cholecystitis.  Will need outpatient follow-up.  Avoid Tylenol


3.  Mild hyperglycemia.  Obtain fasting glucose this can be done outpatient 


4.  Tobacco Abuse:  Pt counselled, NicoDerm prn if needed


DVT Prophylaxis:  SCD/Teds


Discharge Planning


Discharge patient to home


Condition on discharge: Improved


Regular Diet as tolerated


Ad Jocelnye activity no driving


Rx written: Oxycodone


Follow-up with primary care physician, surgery and PET scan in 3-4 weeks.  LFTs 

in 2 weeks











Carlos Choi MD Apr 16, 2018 10:53

## 2018-04-16 NOTE — HHI.DCPOC
Discharge Care Plan


Diagnosis:  


(1) Cholelithiasis


(2) Cholecystitis








Your Health Problems Are: Difficulty with ADL





 Exercise Tolerance








Goals to Promote Your Health


* To prevent worsening of your condition and complications


* To maintain your health at the optimal level


Directions to Meet Your Goals


*** Take your medications as prescribed


*** Follow your dietary instruction


*** Follow activity as directed








*** Keep your appointments as scheduled


*** Take your immunizations and boosters as scheduled


*** If your symptoms worsen call your PCP, if no PCP go to Urgent Care Center 

or Emergency Room***


*** Smoking is Dangerous to Your Health. Avoid second hand smoke***


***Call the 24-hour hour crisis hotline for domestic abuse at 1-515.288.2793***











Carlos Choi MD Apr 16, 2018 07:38

## 2018-04-20 ENCOUNTER — HOSPITAL ENCOUNTER (OUTPATIENT)
Dept: HOSPITAL 17 - NEPE | Age: 63
Setting detail: OBSERVATION
LOS: 3 days | Discharge: HOME | End: 2018-04-23
Attending: HOSPITALIST | Admitting: HOSPITALIST
Payer: MEDICARE

## 2018-04-20 VITALS
HEART RATE: 63 BPM | TEMPERATURE: 98.2 F | SYSTOLIC BLOOD PRESSURE: 189 MMHG | OXYGEN SATURATION: 100 % | RESPIRATION RATE: 16 BRPM | DIASTOLIC BLOOD PRESSURE: 74 MMHG

## 2018-04-20 VITALS
OXYGEN SATURATION: 99 % | RESPIRATION RATE: 20 BRPM | SYSTOLIC BLOOD PRESSURE: 137 MMHG | DIASTOLIC BLOOD PRESSURE: 63 MMHG | HEART RATE: 60 BPM

## 2018-04-20 VITALS — OXYGEN SATURATION: 97 % | RESPIRATION RATE: 18 BRPM

## 2018-04-20 DIAGNOSIS — E11.9: ICD-10-CM

## 2018-04-20 DIAGNOSIS — R51: ICD-10-CM

## 2018-04-20 DIAGNOSIS — R94.31: ICD-10-CM

## 2018-04-20 DIAGNOSIS — R00.2: ICD-10-CM

## 2018-04-20 DIAGNOSIS — I10: ICD-10-CM

## 2018-04-20 DIAGNOSIS — R79.89: ICD-10-CM

## 2018-04-20 DIAGNOSIS — R74.8: ICD-10-CM

## 2018-04-20 DIAGNOSIS — R82.71: ICD-10-CM

## 2018-04-20 DIAGNOSIS — R06.02: ICD-10-CM

## 2018-04-20 DIAGNOSIS — R00.1: ICD-10-CM

## 2018-04-20 DIAGNOSIS — R19.7: ICD-10-CM

## 2018-04-20 DIAGNOSIS — F17.210: ICD-10-CM

## 2018-04-20 DIAGNOSIS — R52: Primary | ICD-10-CM

## 2018-04-20 DIAGNOSIS — R10.11: ICD-10-CM

## 2018-04-20 LAB
ALBUMIN SERPL-MCNC: 2.9 GM/DL (ref 3.4–5)
ALP SERPL-CCNC: 214 U/L (ref 45–117)
ALT SERPL-CCNC: 188 U/L (ref 10–53)
AST SERPL-CCNC: 132 U/L (ref 15–37)
BACTERIA #/AREA URNS HPF: (no result) /HPF
BASOPHILS # BLD AUTO: 0.1 TH/MM3 (ref 0–0.2)
BASOPHILS NFR BLD: 1 % (ref 0–2)
BILIRUB SERPL-MCNC: 0.4 MG/DL (ref 0.2–1)
BUN SERPL-MCNC: 5 MG/DL (ref 7–18)
CALCIUM SERPL-MCNC: 9.1 MG/DL (ref 8.5–10.1)
CHLORIDE SERPL-SCNC: 108 MEQ/L (ref 98–107)
COLOR UR: (no result)
CREAT SERPL-MCNC: 0.91 MG/DL (ref 0.5–1)
EOSINOPHIL # BLD: 0.3 TH/MM3 (ref 0–0.4)
EOSINOPHIL NFR BLD: 5.6 % (ref 0–4)
ERYTHROCYTE [DISTWIDTH] IN BLOOD BY AUTOMATED COUNT: 14.7 % (ref 11.6–17.2)
GFR SERPLBLD BASED ON 1.73 SQ M-ARVRAT: 63 ML/MIN (ref 89–?)
GLUCOSE SERPL-MCNC: 85 MG/DL (ref 74–106)
GLUCOSE UR STRIP-MCNC: (no result) MG/DL
HCO3 BLD-SCNC: 28.9 MEQ/L (ref 21–32)
HCT VFR BLD CALC: 34.7 % (ref 35–46)
HGB BLD-MCNC: 11.7 GM/DL (ref 11.6–15.3)
HGB UR QL STRIP: (no result)
INR PPP: 1 RATIO
KETONES UR STRIP-MCNC: (no result) MG/DL
LYMPHOCYTES # BLD AUTO: 1.9 TH/MM3 (ref 1–4.8)
LYMPHOCYTES NFR BLD AUTO: 31.1 % (ref 9–44)
MCH RBC QN AUTO: 30.3 PG (ref 27–34)
MCHC RBC AUTO-ENTMCNC: 33.7 % (ref 32–36)
MCV RBC AUTO: 89.8 FL (ref 80–100)
MONOCYTE #: 0.4 TH/MM3 (ref 0–0.9)
MONOCYTES NFR BLD: 6.5 % (ref 0–8)
NEUTROPHILS # BLD AUTO: 3.5 TH/MM3 (ref 1.8–7.7)
NEUTROPHILS NFR BLD AUTO: 55.8 % (ref 16–70)
NITRITE UR QL STRIP: (no result)
PLATELET # BLD: 269 TH/MM3 (ref 150–450)
PMV BLD AUTO: 9.6 FL (ref 7–11)
PROT SERPL-MCNC: 6.8 GM/DL (ref 6.4–8.2)
PROTHROMBIN TIME: 10 SEC (ref 9.8–11.6)
RBC # BLD AUTO: 3.86 MIL/MM3 (ref 4–5.3)
SODIUM SERPL-SCNC: 140 MEQ/L (ref 136–145)
SP GR UR STRIP: 1 (ref 1–1.03)
SQUAMOUS #/AREA URNS HPF: 4 /HPF (ref 0–5)
URINE LEUKOCYTE ESTERASE: (no result)
WBC # BLD AUTO: 6.2 TH/MM3 (ref 4–11)

## 2018-04-20 PROCEDURE — G0378 HOSPITAL OBSERVATION PER HR: HCPCS

## 2018-04-20 PROCEDURE — 83605 ASSAY OF LACTIC ACID: CPT

## 2018-04-20 PROCEDURE — 85025 COMPLETE CBC W/AUTO DIFF WBC: CPT

## 2018-04-20 PROCEDURE — 85610 PROTHROMBIN TIME: CPT

## 2018-04-20 PROCEDURE — 84080 ASSAY ALKALINE PHOSPHATASES: CPT

## 2018-04-20 PROCEDURE — 97162 PT EVAL MOD COMPLEX 30 MIN: CPT

## 2018-04-20 PROCEDURE — 96366 THER/PROPH/DIAG IV INF ADDON: CPT

## 2018-04-20 PROCEDURE — 80053 COMPREHEN METABOLIC PANEL: CPT

## 2018-04-20 PROCEDURE — 74181 MRI ABDOMEN W/O CONTRAST: CPT

## 2018-04-20 PROCEDURE — 96376 TX/PRO/DX INJ SAME DRUG ADON: CPT

## 2018-04-20 PROCEDURE — 80074 ACUTE HEPATITIS PANEL: CPT

## 2018-04-20 PROCEDURE — C9113 INJ PANTOPRAZOLE SODIUM, VIA: HCPCS

## 2018-04-20 PROCEDURE — 83690 ASSAY OF LIPASE: CPT

## 2018-04-20 PROCEDURE — 76377 3D RENDER W/INTRP POSTPROCES: CPT

## 2018-04-20 PROCEDURE — 96375 TX/PRO/DX INJ NEW DRUG ADDON: CPT

## 2018-04-20 PROCEDURE — 96365 THER/PROPH/DIAG IV INF INIT: CPT

## 2018-04-20 PROCEDURE — 99285 EMERGENCY DEPT VISIT HI MDM: CPT

## 2018-04-20 PROCEDURE — 93970 EXTREMITY STUDY: CPT

## 2018-04-20 PROCEDURE — 74177 CT ABD & PELVIS W/CONTRAST: CPT

## 2018-04-20 PROCEDURE — 93005 ELECTROCARDIOGRAM TRACING: CPT

## 2018-04-20 PROCEDURE — 81001 URINALYSIS AUTO W/SCOPE: CPT

## 2018-04-20 PROCEDURE — 85730 THROMBOPLASTIN TIME PARTIAL: CPT

## 2018-04-20 PROCEDURE — 96361 HYDRATE IV INFUSION ADD-ON: CPT

## 2018-04-20 PROCEDURE — 71275 CT ANGIOGRAPHY CHEST: CPT

## 2018-04-20 PROCEDURE — 87086 URINE CULTURE/COLONY COUNT: CPT

## 2018-04-20 NOTE — HHI.HP
__________________________________________________





[facility]


Service


[facility]


Primary Care Physician


Jeremy Skinner D.O.


Admission Diagnosis





intractable abdominal pain, bile duct stone


Diagnoses:  


(1) Intractable pain


Diagnosis:  Principal





(2) Elevated LFTs


Diagnosis:  Principal





(3) HTN (hypertension)


Diagnosis:  Principal





Travel History


International Travel<30 Days:  No


Contact w/Intl Traveler <30 Da:  No


Traveled to Known Affected Are:  No


History of Present Illness


This is a 62-year-old female with no significant PMH who presented to the ER 

with complaints of abdominal pain.  I admitted pt recently for similar 

complaints no 4/15/18, found to have cholelithiasis w/ cholecystitis, s/p Lap 

Shelley by Dr. Burt on 4/15/18.  States she was doing well after discharge 

until about 2 days ago when developed RUQ pain, denies nausea, vomiting or 

fever.  Has been taking Oxycodone at home w/ minimal relief.  Also notes 

intermittent SOB.  On arrival, /74, HR 63, O2 sat 100% RA, Afebrile.  CBC 

unremarkable.  Chemistry unremarkable.  LFTs elevated, mildly improved in 

comparison to previous labs.  Total Bili 0.4.  INR 1.0.  UA with bacteriuria.  

CT Abdomen/Pelvis fluid in gallbladder fossa, could be postoperative fluid, 

possible biloma, intra-and extrahepatic biliary ductal dilatation, 4 cm 

lobulated cystic area left retroperitoneum likely benign lymphocele.  CTA Pulm 

negative for PE.  LE Doppler negative.  Dr. Marie consulted by ER physician, 

recommended admission for MRCP, GI consulted.





Review of Systems


Except as stated in HPI:  all other systems reviewed are Neg


ROS: 14 point review of systems otherwise negative.





Past Family Social History


Past Medical History


PMH: None


Past Surgical History


PAST SURGICAL HISTORY: Cholecystectomy, Left Eye Surgery, Hysterectomy, Left 

Femur Surgery


Allergies:  


Coded Allergies:  


     codeine (Unverified  Allergy, Intermediate, Itching, 18)


 N/V


Family History


PAST FAMILY HISTORY:  Reviewed.  No h/o DM or CAD


Social History


PAST SOCIAL HISTORY: Negative for alcohol or drugs.  Smokes 1ppd.





Physical Exam


Vital Signs





Vital Signs








  Date Time  Temp Pulse Resp B/P (MAP) Pulse Ox O2 Delivery O2 Flow Rate FiO2


 


18 20:59  60 20 137/63 (87) 99 Room Air  


 


18 18:48   18  97 Room Air  


 


18 17:02 98.2 63 16 189/74 (112) 100   








Physical Exam


PE:


GENERAL: Very pleasant middle-aged white female in no acute distress.


HEENT: PERRLA, EOMI. No scleral icterus or conjunctival pallor. No lid lag or 

facial droop.  


CARDIOVASCULAR: Regular rate and rhythm.  No obvious murmurs to auscultation. 

No chest tenderness to palpation. 


RESPIRATORY: No obvious rhonchi or wheezing. Clear to auscultation. Breath 

sounds equal bilaterally. 


GASTROINTESTINAL: Abdomen soft, RUQ tenderness to palpation, nondistended. BS 

normal. +bruising


MUSCULOSKELETAL: Extremities without clubbing, cyanosis, or edema. No obvious 

deformities. 


NEUROLOGICAL: Awake, alert and oriented x4. No focal neurologic deficits. 

Moving both upper and lower extremities spontaneously.


Laboratory





Laboratory Tests








Test


  18


18:15 18


18:34


 


White Blood Count 6.2  


 


Red Blood Count 3.86  


 


Hemoglobin 11.7  


 


Hematocrit 34.7  


 


Mean Corpuscular Volume 89.8  


 


Mean Corpuscular Hemoglobin 30.3  


 


Mean Corpuscular Hemoglobin


Concent 33.7 


  


 


 


Red Cell Distribution Width 14.7  


 


Platelet Count 269  


 


Mean Platelet Volume 9.6  


 


Neutrophils (%) (Auto) 55.8  


 


Lymphocytes (%) (Auto) 31.1  


 


Monocytes (%) (Auto) 6.5  


 


Eosinophils (%) (Auto) 5.6  


 


Basophils (%) (Auto) 1.0  


 


Neutrophils # (Auto) 3.5  


 


Lymphocytes # (Auto) 1.9  


 


Monocytes # (Auto) 0.4  


 


Eosinophils # (Auto) 0.3  


 


Basophils # (Auto) 0.1  


 


CBC Comment DIFF FINAL  


 


Differential Comment   


 


Prothrombin Time 10.0  


 


Prothromb Time International


Ratio 1.0 


  


 


 


Activated Partial


Thromboplast Time 24.2 


  


 


 


Blood Urea Nitrogen 5  


 


Creatinine 0.91  


 


Random Glucose 85  


 


Total Protein 6.8  


 


Albumin 2.9  


 


Calcium Level 9.1  


 


Alkaline Phosphatase 214  


 


Aspartate Amino Transf


(AST/SGOT) 132 


  


 


 


Alanine Aminotransferase


(ALT/SGPT) 188 


  


 


 


Total Bilirubin 0.4  


 


Sodium Level 140  


 


Potassium Level 3.9  


 


Chloride Level 108  


 


Carbon Dioxide Level 28.9  


 


Anion Gap 3  


 


Estimat Glomerular Filtration


Rate 63 


  


 


 


Lipase 104  


 


Urine Color  LIGHT-YELLOW 


 


Urine Turbidity  CLEAR 


 


Urine pH  6.5 


 


Urine Specific Gravity  1.003 


 


Urine Protein  NEG 


 


Urine Glucose (UA)  NEG 


 


Urine Ketones  NEG 


 


Urine Occult Blood  NEG 


 


Urine Nitrite  NEG 


 


Urine Bilirubin  NEG 


 


Urine Urobilinogen  LESS THAN 2.0 


 


Urine Leukocyte Esterase  NEG 


 


Urine WBC  LESS THAN 1 


 


Urine Squamous Epithelial


Cells 


  4 


 


 


Urine Bacteria  MANY 


 


Microscopic Urinalysis Comment


  


  CULTURE


INDICATED


 


Lactic Acid Level  0.9 














 Date/Time


Source Procedure


Growth Status


 


 


 18 18:34


Urine Random Urine Urine Culture


Pending Received








Result Diagram:  


18








Caprin VTE Risk Assessment


Caprini VTE Risk Assessment:  No/Low Risk (score <= 1)


Caprini Risk Assessment Model











 Point Value = 1          Point Value = 2  Point Value = 3  Point Value = 5


 


Age 41-60


Minor surgery


BMI > 25 kg/m2


Swollen legs


Varicose veins


Pregnancy or postpartum


History of unexplained or recurrent


   spontaneous 


Oral contraceptives or hormone


   replacement


Sepsis (< 1 month)


Serious lung disease, including


   pneumonia (< 1 month)


Abnormal pulmonary function


Acute myocardial infarction


Congestive heart failure (< 1 month)


History of inflammatory bowel disease


Medical patient at bed rest Age 61-74


Arthroscopic surgery


Major open surgery (> 45 min)


Laparoscopic surgery (> 45 min)


Malignancy


Confined to bed (> 72 hours)


Immobilizing plaster cast


Central venous access Age >= 75


History of VTE


Family history of VTE


Factor V Leiden


Prothrombin 46080W


Lupus anticoagulant


Anticardiolipin antibodies


Elevated serum homocysteine


Heparin-induced thrombocytopenia


Other congenital or acquired


   thrombophilia Stroke (< 1 month)


Elective arthroplasty


Hip, pelvis, or leg fracture


Acute spinal cord injury (< 1 month)








Prophylaxis Regimen











   Total Risk


Factor Score Risk Level Prophylaxis Regimen


 


0-1      Low Early ambulation


 


2 Moderate Order ONE of the following:


*Sequential Compression Device (SCD)


*Heparin 5000 units SQ BID


 


3-4 Higher Order ONE of the following medications:


*Heparin 5000 units SQ TID


*Enoxaparin/Lovenox 40 mg SQ daily (WT < 150 kg, CrCl > 30 mL/min)


*Enoxaparin/Lovenox 30 mg SQ daily (WT < 150 kg, CrCl > 10-29 mL/min)


*Enoxaparin/Lovenox 30 mg SQ BID (WT < 150 kg, CrCl > 30 mL/min)


AND/OR


*Sequential Compression Device (SCD)


 


5 or more Highest Order ONE of the following medications:


*Heparin 5000 units SQ TID (Preferred with Epidurals)


*Enoxaparin/Lovenox 40 mg SQ daily (WT < 150 kg, CrCl > 30 mL/min)


*Enoxaparin/Lovenox 30 mg SQ daily (WT < 150 kg, CrCl > 10-29 mL/min)


*Enoxaparin/Lovenox 30 mg SQ BID (WT < 150 kg, CrCl > 30 mL/min)


AND


*Sequential Compression Device (SCD)











Assessment and Plan


Problem List:  


(1) Intractable pain


ICD Code:  R52 - Pain, unspecified


(2) Elevated LFTs


ICD Code:  R79.89 - Other specified abnormal findings of blood chemistry


(3) HTN (hypertension)


ICD Code:  I10 - Essential (primary) hypertension


Assessment and Plan


A/P:


1.  Intractable Abd Pain:  recent admit 4/15/18 for RUQ pain, +cholelithiasis/

cholecystitis s/p Lap Shelley by Dr. Burt, now w/ RUQ pain.  CT Abd/Pelvis w/ 

fluid in gallbladder fossa, likely postop fluid or biloma, +intra and extra 

hepatic biliary ductal dilatation, images reviewed by me.  Analgesics/

antiemetics as needed.  CTA Pulm negative for PE.


2.  Elevated LFTs:  ALP increased since last admit, AST/ALT mildly improved, 

total bili normal, in light of above, possible retained stone, Dr. Schroeder 

consulted, recommended MRCP, GI Consulted, will evaluate.  Repeat labs in am.  

Caution w/ Tylenol.


3.  HTN:  Uncontrolled.  's likely pain related, optimize pain control, 

monitor BP 


4.  DVT Prophylaxis:  SCD/Teds. 


5.  Social work for DC planning as needed.


6.  Case discussed at length with the ER physician, lab/record/imaging reviewed 

by me.











Meron Quintanilla MD 2018 22:40

## 2018-04-20 NOTE — RADRPT
EXAM DATE/TIME:  04/20/2018 19:46 

 

[facility] COMPARISON:     

No previous studies available for comparison.

        

 

 

INDICATIONS :                

Bilateral leg swelling.

            

 

MEDICAL HISTORY :        

Headaches. Blood transfusion.

 

SURGICAL HISTORY :     

Cholecystectomy.Hysterectomy. Left lens replacement. Left hip ORIF.

 

ENCOUNTER:     

Initial

 

ACUITY:     

1 day

 

PAIN SCORE:      

2/10

 

LOCATION:      

Bilateral  legs.

                       

 

TECHNIQUE:     

Venous ultrasound of the left and right leg was performed from the inguinal ligament to the proximal 
calf.  Real-time, color Doppler and spectral tracing, compression and augmentation techniques were us
ed.  

 

FINDINGS:     

 

RIGHT LEG:     

There is normal compressibility of the deep venous system from the inguinal region to the proximal ca
lf.  No echogenic clot is seen in the lumen of the common femoral, femoral, popliteal, and posterior 
tibial veins.  There is a normal response of the venous system to proximal and distal augmentation an
d respiration.  

 

LEFT LEG:     

There is normal compressibility of the deep venous system from the inguinal region to the proximal ca
lf.  No echogenic clot is seen in the lumen of the common femoral, femoral, popliteal, and posterior 
tibial veins.  There is a normal response of the venous system to proximal and distal augmentation an
d respiration.  

 

CONCLUSION:     No DVT.

 

 

 

 Sang Moran MD on April 20, 2018 at 20:40           

Board Certified Radiologist.

 This report was verified electronically.

## 2018-04-20 NOTE — RADRPT
EXAM DATE/TIME:  04/20/2018 19:17 

 

[facility] COMPARISON:     

No previous studies available for comparison.

 

 

INDICATIONS :     

Left chest pain and tachycardia; rule out pulmonary embolus.

                      

 

IV CONTRAST:     

98 cc Omnipaque 350 (iohexol) IV ; Cumulative dose for multiple exams.

 

 

RADIATION DOSE:     

18.18 CTDIvol (mGy) 

 

 

MEDICAL HISTORY :     

None  

 

SURGICAL HISTORY :      

Hysterectomy. Cholecystectomy.

 

ENCOUNTER:      

Initial

 

ACUITY:      

1 day

 

PAIN SCALE:      

3/10

 

LOCATION:        

chest 

 

TECHNIQUE:     

Volumetric scanning of the chest was performed using a pulmonary embolism protocol MIP images were re
constructed.  Using automated exposure control and adjustment of the mA and/or kV according to patien
t size, radiation dose was kept as low as reasonably achievable to obtain optimal diagnostic quality 
images.   DICOM format image data is available electronically for review and comparison.  

 

Follow-up recommendations for detected pulmonary nodules are based at a minimum on nodule size and pa
tient risk factors according to Fleischner Society Guidelines.

 

FINDINGS:     

 

PULMONARY ARTERIES:

No filling defects are seen in the pulmonary arteries through the segmental level.

 

LUNGS:     

There is no consolidation or pneumothorax .  No concerning pulmonary nodule is visualized.

 

PLEURAE:     

There is no pleural thickening or pleural effusion.

 

MEDIASTINUM:     

There is good visualization of the great vessels of the middle mediastinum.  No evidence of mediastin
al or hilar adenopathy/mass.

 

MUSCULOSKELETAL:     

Within normal limits for patient age.

 

MISCELLANEOUS:     

The visualized upper abdominal organs demonstrate no acute abnormality.

 

CONCLUSION:     

No acute disease.  No pulmonary embolus is seen.

 

 

 

 

 Sang Moran MD on April 20, 2018 at 20:16           

Board Certified Radiologist.

 This report was verified electronically.

## 2018-04-20 NOTE — RADRPT
EXAM DATE/TIME:  04/20/2018 19:17 

 

[facility] COMPARISON:     

CTA THORACIC ABDOMINAL AORTA W 3D RECON, April 14, 2018, 23:39.

 

 

INDICATIONS :     

Right upper quadrant pain.

                      

 

IV CONTRAST:     

98 cc Omnipaque 350 (iohexol) IV ; Cumulative dose for multiple exams.

 

 

ORAL CONTRAST:      

No oral contrast ingested.

                      

 

RADIATION DOSE:     

24.01 CTDIvol (mGy) 

 

 

MEDICAL HISTORY :     

None  

 

SURGICAL HISTORY :      

Hysterectomy. Cholecystectomy.

 

ENCOUNTER:      

Initial

 

ACUITY:      

1 day

 

PAIN SCALE:      

6/10

 

LOCATION:         

abdomen

 

TECHNIQUE:     

Volumetric scanning of the abdomen and pelvis was performed.  Using automated exposure control and ad
justment of the mA and/or kV according to patient size, radiation dose was kept as low as reasonably 
achievable to obtain optimal diagnostic quality images.  DICOM format image data is available electro
nically for review and comparison.  

 

FINDINGS:     

 

LOWER LUNGS:     

The visualized lower lungs are clear.

 

LIVER:     

Clips are seen in the right upper quadrant. The patient appears to status post cholecystectomy. There
 is fluid seen in the gallbladder fossa. There is dilatation of the intra-and extra hepatic biliary d
ucts. The common bile duct measures 1 cm. There is a small focus of air in the gallbladder fossa. The
re is intra-abdominal air seen the peritoneal cavity. There is postoperative change and air seen in t
he anterior abdominal wall.

 

SPLEEN:     

Normal size without lesion.

 

PANCREAS:     

Within normal limits.

 

KIDNEYS:     

Normal in size and shape.  There is no mass, stone or hydronephrosis.

 

ADRENAL GLANDS:     

Within normal limits.

 

VASCULAR:     

There is no aortic aneurysm. Scattered atherosclerotic calcifications are present.

 

BOWEL/MESENTERY:     

There is free intraperitoneal air seen. There is a mild hiatal hernia. Scattered colonic diverticula 
are present.

 

ABDOMINAL WALL:     

Within normal limits.

 

RETROPERITONEUM:     

Air is seen in the retroperitoneum in the right lateral and posterior abdomen posterior to the right 
kidney. There is a lobulated cystic mass seen in the left retroperitoneum measuring 4.2 x 3.6 x 2.2 c
m. This is nonspecific. There is no lymphadenopathy. 

 

BLADDER:     

No wall thickening or mass. 

 

REPRODUCTIVE:     

The patient is status post hysterectomy.

 

INGUINAL:     

There is no lymphadenopathy or hernia. 

 

MUSCULOSKELETAL:     

Surgical hardware seen in the left proximal femur from prior ORIF. 

 

CONCLUSION:     

1. Fluid in the gallbladder fossa. This could be postoperative fluid. This does not appear to have a 
capsule. A biloma could have a similar appearance. There is intra-and extrahepatic biliary duct dilat
ation.

2. Free intraperitoneal air, retroperitoneal, and air within the anterior abdominal wall. Presumably,
 this is post surgical. This should be correlated with the date of the cholecystectomy.

3. 4 cm lobulated cystic area in the left retroperitoneum likely related to benign cystic change such
 as a lymphocele.

 

 

 

 Sang Moran MD on April 20, 2018 at 21:33           

Board Certified Radiologist.

 This report was verified electronically.
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
## 2018-04-21 VITALS
RESPIRATION RATE: 18 BRPM | HEART RATE: 78 BPM | OXYGEN SATURATION: 97 % | DIASTOLIC BLOOD PRESSURE: 63 MMHG | SYSTOLIC BLOOD PRESSURE: 133 MMHG

## 2018-04-21 VITALS
HEART RATE: 57 BPM | RESPIRATION RATE: 16 BRPM | SYSTOLIC BLOOD PRESSURE: 98 MMHG | OXYGEN SATURATION: 97 % | DIASTOLIC BLOOD PRESSURE: 61 MMHG | TEMPERATURE: 97.7 F

## 2018-04-21 VITALS
OXYGEN SATURATION: 100 % | RESPIRATION RATE: 14 BRPM | SYSTOLIC BLOOD PRESSURE: 125 MMHG | HEART RATE: 49 BPM | DIASTOLIC BLOOD PRESSURE: 58 MMHG

## 2018-04-21 VITALS
OXYGEN SATURATION: 96 % | RESPIRATION RATE: 18 BRPM | SYSTOLIC BLOOD PRESSURE: 110 MMHG | DIASTOLIC BLOOD PRESSURE: 57 MMHG | TEMPERATURE: 98.9 F | HEART RATE: 53 BPM

## 2018-04-21 VITALS
RESPIRATION RATE: 18 BRPM | HEART RATE: 68 BPM | DIASTOLIC BLOOD PRESSURE: 59 MMHG | OXYGEN SATURATION: 99 % | SYSTOLIC BLOOD PRESSURE: 125 MMHG

## 2018-04-21 LAB
ALBUMIN SERPL-MCNC: 2.7 GM/DL (ref 3.4–5)
ALP SERPL-CCNC: 169 U/L (ref 45–117)
ALT SERPL-CCNC: 133 U/L (ref 10–53)
AST SERPL-CCNC: 68 U/L (ref 15–37)
BASOPHILS # BLD AUTO: 0 TH/MM3 (ref 0–0.2)
BASOPHILS NFR BLD: 0.8 % (ref 0–2)
BILIRUB SERPL-MCNC: 0.4 MG/DL (ref 0.2–1)
BUN SERPL-MCNC: 4 MG/DL (ref 7–18)
CALCIUM SERPL-MCNC: 8.3 MG/DL (ref 8.5–10.1)
CHLORIDE SERPL-SCNC: 109 MEQ/L (ref 98–107)
CREAT SERPL-MCNC: 0.67 MG/DL (ref 0.5–1)
EOSINOPHIL # BLD: 0.3 TH/MM3 (ref 0–0.4)
EOSINOPHIL NFR BLD: 5.8 % (ref 0–4)
ERYTHROCYTE [DISTWIDTH] IN BLOOD BY AUTOMATED COUNT: 14.7 % (ref 11.6–17.2)
GFR SERPLBLD BASED ON 1.73 SQ M-ARVRAT: 90 ML/MIN (ref 89–?)
GLUCOSE SERPL-MCNC: 80 MG/DL (ref 74–106)
HCO3 BLD-SCNC: 23.5 MEQ/L (ref 21–32)
HCT VFR BLD CALC: 32 % (ref 35–46)
HGB BLD-MCNC: 10.6 GM/DL (ref 11.6–15.3)
LYMPHOCYTES # BLD AUTO: 1.3 TH/MM3 (ref 1–4.8)
LYMPHOCYTES NFR BLD AUTO: 22.7 % (ref 9–44)
MCH RBC QN AUTO: 30.3 PG (ref 27–34)
MCHC RBC AUTO-ENTMCNC: 33 % (ref 32–36)
MCV RBC AUTO: 91.8 FL (ref 80–100)
MONOCYTE #: 0.4 TH/MM3 (ref 0–0.9)
MONOCYTES NFR BLD: 7.5 % (ref 0–8)
NEUTROPHILS # BLD AUTO: 3.6 TH/MM3 (ref 1.8–7.7)
NEUTROPHILS NFR BLD AUTO: 63.2 % (ref 16–70)
PLATELET # BLD: 228 TH/MM3 (ref 150–450)
PMV BLD AUTO: 9.3 FL (ref 7–11)
PROT SERPL-MCNC: 5.9 GM/DL (ref 6.4–8.2)
RBC # BLD AUTO: 3.48 MIL/MM3 (ref 4–5.3)
SODIUM SERPL-SCNC: 141 MEQ/L (ref 136–145)
WBC # BLD AUTO: 5.6 TH/MM3 (ref 4–11)

## 2018-04-21 RX ADMIN — SODIUM CHLORIDE PRN MG: 900 INJECTION, SOLUTION INTRAVENOUS at 13:53

## 2018-04-21 RX ADMIN — ONDANSETRON PRN MG: 2 INJECTION, SOLUTION INTRAMUSCULAR; INTRAVENOUS at 08:12

## 2018-04-21 RX ADMIN — TAZOBACTAM SODIUM AND PIPERACILLIN SODIUM SCH MLS/HR: 375; 3 INJECTION, SOLUTION INTRAVENOUS at 22:21

## 2018-04-21 RX ADMIN — ACETAMINOPHEN PRN MG: 325 TABLET ORAL at 00:26

## 2018-04-21 RX ADMIN — PHENYTOIN SODIUM SCH MLS/HR: 50 INJECTION INTRAMUSCULAR; INTRAVENOUS at 22:21

## 2018-04-21 RX ADMIN — ONDANSETRON PRN MG: 2 INJECTION, SOLUTION INTRAMUSCULAR; INTRAVENOUS at 11:39

## 2018-04-21 RX ADMIN — HYDROMORPHONE HYDROCHLORIDE PRN MG: 2 INJECTION INTRAMUSCULAR; INTRAVENOUS; SUBCUTANEOUS at 09:30

## 2018-04-21 RX ADMIN — Medication SCH ML: at 09:00

## 2018-04-21 RX ADMIN — HYDROMORPHONE HYDROCHLORIDE PRN MG: 2 INJECTION INTRAMUSCULAR; INTRAVENOUS; SUBCUTANEOUS at 14:09

## 2018-04-21 RX ADMIN — HYDROMORPHONE HYDROCHLORIDE PRN MG: 2 INJECTION INTRAMUSCULAR; INTRAVENOUS; SUBCUTANEOUS at 18:06

## 2018-04-21 RX ADMIN — PANTOPRAZOLE SODIUM SCH MG: 40 INJECTION, POWDER, FOR SOLUTION INTRAVENOUS at 13:52

## 2018-04-21 RX ADMIN — STANDARDIZED SENNA CONCENTRATE AND DOCUSATE SODIUM SCH TAB: 8.6; 5 TABLET, FILM COATED ORAL at 22:22

## 2018-04-21 RX ADMIN — STANDARDIZED SENNA CONCENTRATE AND DOCUSATE SODIUM SCH TAB: 8.6; 5 TABLET, FILM COATED ORAL at 09:30

## 2018-04-21 RX ADMIN — TAZOBACTAM SODIUM AND PIPERACILLIN SODIUM SCH MLS/HR: 375; 3 INJECTION, SOLUTION INTRAVENOUS at 13:52

## 2018-04-21 RX ADMIN — PHENYTOIN SODIUM SCH MLS/HR: 50 INJECTION INTRAMUSCULAR; INTRAVENOUS at 00:13

## 2018-04-21 RX ADMIN — Medication SCH ML: at 22:21

## 2018-04-21 RX ADMIN — PHENYTOIN SODIUM SCH MLS/HR: 50 INJECTION INTRAMUSCULAR; INTRAVENOUS at 10:18

## 2018-04-21 RX ADMIN — ACETAMINOPHEN PRN MG: 325 TABLET ORAL at 17:13

## 2018-04-21 NOTE — EKG
Date Performed: 04/20/2018       Time Performed: 19:09:04

 

PTAGE:      62 years

 

EKG:      SINUS BRADYCARDIA LOW QRS VOLTAGE IN PRECORDIAL LEADS MODERATE ST DEPRESSION Compared to pr
evious tracing, bradycardia is new ABNORMAL ECG

 

PREVIOUS TRACING       : 04/14/2018 19.30

 

DOCTOR:   Fidel Price  Interpretating Date/Time  04/21/2018 18:57:15

## 2018-04-21 NOTE — HHI.PR
Subjective


Remarks


in no acute distress.


but uncomfortable with abdominal pain.


afebrile.


d/w the RN.





Objective


Vitals





Vital Signs








  Date Time  Temp Pulse Resp B/P (MAP) Pulse Ox O2 Delivery O2 Flow Rate FiO2


 


4/21/18 03:30  49 14 125/58 (80) 100 Room Air  


 


4/21/18 00:03        


 


4/20/18 20:59  60 20 137/63 (87) 99 Room Air  


 


4/20/18 18:48   18  97 Room Air  


 


4/20/18 17:02 98.2 63 16 189/74 (112) 100   














I/O      


 


 4/20/18 4/20/18 4/20/18 4/21/18 4/21/18 4/21/18





 07:00 15:00 23:00 07:00 15:00 23:00


 


Intake Total   1000 ml   


 


Balance   1000 ml   


 


      


 


Intake IV Total   1000 ml   








Result Diagram:  


4/20/18 1815 4/20/18 1815





Imaging





Last Impressions








Abdomen/Pelvis CT 4/20/18 1810 Signed





Impressions: 





 Service Date/Time:  Friday, April 20, 2018 19:17 - CONCLUSION:  1. Fluid in 

the 





 gallbladder fossa. This could be postoperative fluid. This does not appear to 





 have a capsule. A biloma could have a similar appearance. There is intra-and 





 extrahepatic biliary duct dilatation. 2. Free intraperitoneal air, 





 retroperitoneal, and air within the anterior abdominal wall. Presumably, this 

is 





 post surgical. This should be correlated with the date of the cholecystectomy. 





 3. 4 cm lobulated cystic area in the left retroperitoneum likely related to 





 benign cystic change such as a lymphocele.     Sang Moran MD 


 


Lower Extremity Ultrasound 4/20/18 0000 Signed





Impressions: 





 Service Date/Time:  Friday, April 20, 2018 19:46 - CONCLUSION: No DVT.     

Sang Moran MD 


 


CT Angiography 4/20/18 0000 Signed





Impressions: 





 Service Date/Time:  Friday, April 20, 2018 19:17 - CONCLUSION:  No acute 





 disease.  No pulmonary embolus is seen.      Sang Moran MD 








Objective Remarks


GENERAL: This is a well-nourished, well-developed patient, in no apparent 

distress.


CARDIOVASCULAR: Regular rate and regular rhythm without murmurs, gallops, or 

rubs. 


RESPIRATORY: Clear to auscultation. Breath sounds equal bilaterally. No wheezes

, rales, or rhonchi.  


GASTROINTESTINAL: Abdomen soft, RUQ tenderness, nondistended. 


Normal, active bowel sounds


MUSCULOSKELETAL: Extremities without clubbing, cyanosis, or edema.


NEURO:  Alert & Oriented x4 to person, place, time, situation.  Moves all ext x4


Medications and IVs





Inpatient Medications


Acetaminophen (Tylenol) 650 mg Q6H  PRN PO FEVER/PAIN SCALE 1 TO 2 Last 

administered on 4/21/18at 00:26;  Start 4/20/18 at 22:45


Bisacodyl (Dulcolax Supp) 10 mg DAILY  PRN RECTAL SEVERE CONSITIPATION;  Start 4 /20/18 at 22:45


Hydromorphone HCl (Dilaudid Pf Inj) 0.5 mg ONCE  ONCE IV PUSH  Last 

administered on 4/20/18at 20:55;  Start 4/20/18 at 20:15;  Stop 4/20/18 at 20:16

;  Status DC


Lactulose (Lactulose Liq) 30 ml DAILY  PRN PO SEVERE CONSITIPATION;  Start 4/20/ 18 at 22:45


Magnesium Hydroxide (Milk Of Magnesia Liq) 30 ml Q12H  PRN PO Mild constipation

;  Start 4/20/18 at 22:45


Morphine Sulfate (Morphine Inj) 2 mg Q3H  PRN IV PUSH Pain 6-10;  Start 4/20/18 

at 22:45


Ondansetron HCl (Zofran Inj) 4 mg Q6H  PRN IVP NAUSEA OR VOMITING;  Start 4/20/ 18 at 22:45


Oxycodone HCl (Roxicodone) 5 mg Q4H  PRN PO PAIN SCALE 3 TO 5;  Start 4/20/18 

at 22:45


Senna/Docusate Sodium (Yara-Colace) 1 tab BID PO ;  Start 4/21/18 at 09:00


Sennosides (Senokot) 17.2 mg Q12H  PRN PO Moderate constipation;  Start 4/20/18 

at 22:45


Sodium Chloride (NS Flush) 2 ml BID IV FLUSH ;  Start 4/21/18 at 09:00





A/P


Problem List:  


(1) Intractable pain


ICD Code:  R52 - Pain, unspecified


(2) Elevated LFTs


ICD Code:  R79.89 - Other specified abnormal findings of blood chemistry


(3) HTN (hypertension)


ICD Code:  I10 - Essential (primary) hypertension


Assessment and Plan


A/P  





1.  Intractable Abd Pain:  recent admit 4/15/18 for RUQ pain, +cholelithiasis/

cholecystitis s/p Lap [place] by Dr. Burt, now w/ RUQ pain.  CT Abd/Pelvis w/ 

fluid in gallbladder fossa, likely postop fluid or biloma, +intra and extra 

hepatic biliary ductal dilatation.  Analgesics/antiemetics as needed.  CTA Pulm 

negative for PE.


  surgery and GI consulted.


2.  Elevated LFTs:  ALP increased since last admit, AST/ALT mildly improved, 

total bili normal, in light of above, possible retained stone, Dr. Schroeder 

consulted, recommended MRCP, GI Consulted, will evaluate.  


3.  HTN:  Uncontrolled.  's likely pain related, optimize pain control, 

monitor BP 


4.  DVT Prophylaxis:  SCD/Teds.











Madai Juares MD Apr 21, 2018 08:05

## 2018-04-21 NOTE — RADRPT
EXAM DATE/TIME:  04/21/2018 09:54 

 

[facility] COMPARISON:     

CT ABDOMEN & PELVIS W CONTRAST, April 20, 2018, 19:17.

       

 

 

INDICATIONS :     

Abdominal pain. Status post recent cholecystectomy approximately 6 days ago.

                     

 

MEDICAL HISTORY :     

None.     

 

SURGICAL HISTORY :     

Cholecystectomy. Hysterectomy.   

 

ENCOUNTER:     

Initial

 

ACUITY:     

1 day

 

PAIN SCORE:     

5/10

 

LOCATION:         

Abdomen.

 

TECHNIQUE:     

Multiplanar, multisequence magnetic resonance imaging of the abdomen was performed.  High-resolution 
3D dataset was utilized to reconstruct maximum-intensity projection (MIP) images.

 

FINDINGS:     

 

INTRAHEPATIC BILE DUCTS:     

Within normal limits status post cholecystectomy. No significant anatomical variant is present.

 

EXTRAHEPATIC BILE DUCTS:     

The common bile duct measures 9 mm No stone or filling defect is identified.

 

GALLBLADDER:     

Status post cholecystectomy with mild inflammatory change and fluid in the region the gallbladder fos
sa.

 

LIVER:     

Normal size and signal intensity. No concerning liver lesion is identified on this non-contrast exam.


 

PANCREAS:     

The main pancreatic duct is normal in size.  There is no significant anatomical variant.  Signal inte
nsity is within normal limits.  No mass is visualized on this non-contrast exam.

 

OTHER:     

The remaining visualized structures demonstrate no acute abnormality on this non-contrast exam. Posts
urgical changes are noted involving the anterior abdominal wall musculature. There is a lobular cysti
c collection again noted in left retroperitoneum measuring 4.2 x 3 cm in diameter which may represent
 a lymphocele.

 

CONCLUSION:     

1. Status post recent cholecystectomy with expected postsurgical changes including small amount of fl
uid and mild inflammatory change in the gallbladder fossa region. Followup imaging could be performed
 if there is clinical concern for bile leak.

2. The common bile duct measures up to 9 mm which can be seen after cholecystectomy.

3. Lobular fluid collection left retroperitoneum again noted which may represent a lymphocele.

 

 

 

 Gilbert Schwartz MD on April 21, 2018 at 10:46           

Board Certified Radiologist.

 This report was verified electronically.

## 2018-04-21 NOTE — MB
cc:

Jeremy Schroeder MD

****

 

 

DATE:

04/21/2018

 

REQUESTING PHYSICIAN:

Dr. Meron Quintanilla.

 

REASON FOR CONSULTATION:

Right upper quadrant pain, elevated LFTs after laparoscopic 

cholecystectomy.

 

HISTORY OF PRESENT ILLNESS:

The patient is a 62-year-old female who is status post laparoscopic 

cholecystectomy by Dr. Horacio Burt on 04/15/2018.  At that time, the 

findings were an inflamed, distended gallbladder with multiple 

gallstones.  This apparently was unremarkable at that time.  This was 

done for acute cholecystitis.  The patient was home when she developed

24 hours of increased right upper quadrant pain, nausea, and vomiting.

 She presented to the emergency department and found to have mildly 

elevated transaminases, , , alkaline phosphatase 214.  

Bilirubin is normal.  White blood cell count was within normal limits.

 The patient was also noted to have a urinalysis with many bacteria 

with culture indicated.  The patient was admitted.  GI surgery and 

gastroenterology were consulted.  The patient states that her right 

upper quadrant pain was significantly associated with palpitations and

shortness of breath.  She has been having bowel movements since 

surgery and is able to tolerate a diet.  She denies fever, chills or 

night sweats.  She denies chest pain at this time.  A CT angiogram was

negative.  CT abdomen and pelvis just shows really no surgical 

complication.  MRCP shows no bile duct stones.

 

REVIEW OF SYSTEMS:

A 12-point review of systems conducted with the patient is negative 

except for the pertinent positives mentioned above in the history of 

present illness.

 

PAST MEDICAL HISTORY:

Gallstones, status post acute cholecystitis as above.

 

PAST SURGICAL HISTORY:

Cholecystectomy as above with a hysterectomy and the left femur 

surgery.

 

ALLERGIES:

CODEINE.

 

SOCIAL HISTORY:

Smokes 1 pack of cigarettes per day.  Denies alcohol or illicit drug 

use.

 

MEDICATIONS:

No home medications.

 

FAMILY HISTORY:

Noncontributory.

 

PHYSICAL EXAMINATION:

VITAL SIGNS:  Blood pressure is 133/63, heart rate 78, afebrile, O2 

saturation 97%

GENERAL:  The patient is a well-developed, well-nourished,  

female, in no acute distress.

HEENT:  Head is normocephalic, atraumatic.  Pupils equal, round, 

reactive to light.  Sclerae are anicteric.  Oral cavity is clear.  

Airway is patent.

NECK:  Supple.  No JVD.

LUNGS:  Breath sounds present bilaterally.  Nonlabored breathing 

pattern.

HEART:  Regular rate and rhythm.  PMI is nondisplaced.

ABDOMEN:  Soft, subjectively tender on the right side without rebound 

tenderness or peritonitis.  Surgical incisions are well healed without

complication.

BACK:  No CVA tenderness.

EXTREMITIES:  No clubbing, cyanosis or edema.

NEUROLOGIC:  The patient is alert and oriented x 3.  Nonfocal 

peripheral exam.  Cranial nerves 2-12 are grossly intact.

 

ASSESSMENT AND PLAN:

The patient is a 62-year-old female several days status post 

laparoscopic cholecystectomy.  Clinically, we have a high suspicion 

for retained bile duct stones after cholecystectomy due to her 

symptomatology and labs; however, an MRCP does not show any definitive

stones.  I agree with the gastroenterology consultation as well as 

their recommendations.  We will go ahead and place the patient on 

antibiotics as there was some inflammation in the gallbladder fossa.  

Again, this is unlikely an active infection, probably just 

inflammation from the surgery, but we will cover any possible 

infection as well as possible UTI as the patient has a positive UA.  I

do not recommend any acute surgical intervention at this time.  We 

will follow along with the patient.  Dr. Burt will see the patient 

back when he returns on Monday.

 

Thank you very much for this consultation and for your assistance in 

caring for this patient.

 

 

__________________________________

MD MOE Quiros/TEODORO

D: 04/21/2018, 12:54 PM

T: 04/21/2018, 02:13 PM

Visit #: [id number]

Job #: [id number]

## 2018-04-21 NOTE — PD.CONS
HPI


History of Present Illness


This is a 62 year old who presented to the ER with complaints of RUQ abdominal 

pain. Pt underwent recent laparoscopic cholecystectomy by Dr. Burt on April 15th for cholelithiasis with cholecystitis.  Pt states she had some pain post 

surgical but described it as just soreness. States the pain has been 

progressing and now describes it with intermittent sharp, stabbing pains.  

Associated nausea, denies emesis.  Reports of chills, denies fevers. Reports 

diarrhea, has had this issue for two weeks.  Last BM was yesterday. Denies 

blood in the stool.  Our service has been consulted to evaluate patient for 

elevated LFTs and CT findings. Of note, pt with transaminitis after lap adrián 

per previous progress notes, surgery related it to the procedure.  Pt was 

advised to have repeat labs in 2 weeks and avoid Tylenol.  Pt denies history of 

liver issues.  Denies ETOH, smoking.  Of note, she does take Goodys Powder 

daily for headaches.  Has never had EGD or colonoscopy.





[facility]


Past Medical History


Denies


Past Surgical History


PAST SURGICAL HISTORY: Cholecystectomy, Left Eye Surgery, Hysterectomy, Left 

Femur Surgery


Coded Allergies:  


     codeine (Unverified  Allergy, Intermediate, Itching, 4/20/18)


 N/V


Family History


PAST FAMILY HISTORY:  Reviewed.  No h/o DM or CAD


Social History


PAST SOCIAL HISTORY: Negative for alcohol or drugs.





Review of Systems


Gastrointestinal:  COMPLAINS OF: Abdominal pain, Diarrhea, Nausea, DENIES: 

Black stools, Bloody stools, Constipation, Vomiting, Difficulty Swallowing, 

Odynophagia, Swelling of Abdomen, Heartburn, Hematemesis





GI Exam


Vitals I&O





Vital Signs








  Date Time  Temp Pulse Resp B/P (MAP) Pulse Ox O2 Delivery O2 Flow Rate FiO2


 


4/21/18 03:30  49 14 125/58 (80) 100 Room Air  


 


4/21/18 00:03        


 


4/20/18 20:59  60 20 137/63 (87) 99 Room Air  


 


4/20/18 18:48   18  97 Room Air  


 


4/20/18 17:02 98.2 63 16 189/74 (112) 100   














I/O      


 


 4/20/18 4/20/18 4/20/18 4/21/18 4/21/18 4/21/18





 07:00 15:00 23:00 07:00 15:00 23:00


 


Intake Total   1000 ml   


 


Balance   1000 ml   


 


      


 


Intake IV Total   1000 ml   








Imaging





Last Impressions








Abdomen/Pelvis CT 4/20/18 1810 Signed





Impressions: 





 Service Date/Time:  Friday, April 20, 2018 19:17 - CONCLUSION:  1. Fluid in 

the 





 gallbladder fossa. This could be postoperative fluid. This does not appear to 





 have a capsule. A biloma could have a similar appearance. There is intra-and 





 extrahepatic biliary duct dilatation. 2. Free intraperitoneal air, 





 retroperitoneal, and air within the anterior abdominal wall. Presumably, this 

is 





 post surgical. This should be correlated with the date of the cholecystectomy. 





 3. 4 cm lobulated cystic area in the left retroperitoneum likely related to 





 benign cystic change such as a lymphocele.     Sang Moran MD 


 


Lower Extremity Ultrasound 4/20/18 0000 Signed





Impressions: 





 Service Date/Time:  Friday, April 20, 2018 19:46 - CONCLUSION: No DVT.     

Sang Moran MD 


 


CT Angiography 4/20/18 0000 Signed





Impressions: 





 Service Date/Time:  Friday, April 20, 2018 19:17 - CONCLUSION:  No acute 





 disease.  No pulmonary embolus is seen.      Sang Moran MD 








Laboratory











Test


  4/20/18


18:15 4/20/18


18:34 4/20/18


23:57


 


White Blood Count 6.2 TH/MM3   


 


Red Blood Count 3.86 MIL/MM3   


 


Hemoglobin 11.7 GM/DL   


 


Hematocrit 34.7 %   


 


Mean Corpuscular Volume 89.8 FL   


 


Mean Corpuscular Hemoglobin 30.3 PG   


 


Mean Corpuscular Hemoglobin


Concent 33.7 % 


  


  


 


 


Red Cell Distribution Width 14.7 %   


 


Platelet Count 269 TH/MM3   


 


Mean Platelet Volume 9.6 FL   


 


Neutrophils (%) (Auto) 55.8 %   


 


Lymphocytes (%) (Auto) 31.1 %   


 


Monocytes (%) (Auto) 6.5 %   


 


Eosinophils (%) (Auto) 5.6 %   


 


Basophils (%) (Auto) 1.0 %   


 


Neutrophils # (Auto) 3.5 TH/MM3   


 


Lymphocytes # (Auto) 1.9 TH/MM3   


 


Monocytes # (Auto) 0.4 TH/MM3   


 


Eosinophils # (Auto) 0.3 TH/MM3   


 


Basophils # (Auto) 0.1 TH/MM3   


 


CBC Comment DIFF FINAL   


 


Differential Comment    


 


Prothrombin Time 10.0 SEC   


 


Prothromb Time International


Ratio 1.0 RATIO 


  


  


 


 


Activated Partial


Thromboplast Time 24.2 SEC 


  


  


 


 


Blood Urea Nitrogen 5 MG/DL   


 


Creatinine 0.91 MG/DL   


 


Random Glucose 85 MG/DL   


 


Total Protein 6.8 GM/DL   


 


Albumin 2.9 GM/DL   


 


Calcium Level 9.1 MG/DL   


 


Alkaline Phosphatase 214 U/L   


 


Aspartate Amino Transf


(AST/SGOT) 132 U/L 


  


  


 


 


Alanine Aminotransferase


(ALT/SGPT) 188 U/L 


  


  


 


 


Total Bilirubin 0.4 MG/DL   


 


Sodium Level 140 MEQ/L   


 


Potassium Level 3.9 MEQ/L   


 


Chloride Level 108 MEQ/L   


 


Carbon Dioxide Level 28.9 MEQ/L   


 


Anion Gap 3 MEQ/L   


 


Estimat Glomerular Filtration


Rate 63 ML/MIN 


  


  


 


 


Lipase 104 U/L   


 


Urine Color  LIGHT-YELLOW  


 


Urine Turbidity  CLEAR  


 


Urine pH  6.5  


 


Urine Specific Gravity  1.003  


 


Urine Protein  NEG mg/dL  


 


Urine Glucose (UA)  NEG mg/dL  


 


Urine Ketones  NEG mg/dL  


 


Urine Occult Blood  NEG  


 


Urine Nitrite  NEG  


 


Urine Bilirubin  NEG  


 


Urine Urobilinogen


  


  LESS THAN 2.0


MG/DL 


 


 


Urine Leukocyte Esterase  NEG  


 


Urine WBC


  


  LESS THAN 1


/hpf 


 


 


Urine Squamous Epithelial


Cells 


  4 /hpf 


  


 


 


Urine Bacteria  MANY /hpf  


 


Microscopic Urinalysis Comment


  


  CULTURE


INDICATED 


 


 


Lactic Acid Level  0.9 mmol/L  














 Date/Time


Source Procedure


Growth Status


 


 


 4/20/18 18:34


Urine Random Urine Urine Culture


Pending Received








Physical Examination


HEENT: Normocephalic; atraumatic


CHEST:  Even/unlabored


CARDIAC:  RRR


ABDOMEN:  Round, soft, upper abdominal TTP, bowel sounds active 


SKIN:  Normal; no rash; no jaundice.


CNS:  No focal deficits; alert and oriented times three.





Assessment and Plan


Plan


Assessment:


- Elevated LFTs- Elevated during previous post surgical- according to previous


  progress notes, according to surgery this was likely for lap adrián procedure.


  On 4/16 LFTs: AST-252 ALT-321 Alk phos-121 T bili- 0.6


  Today (4/21)  AST:132 ALT-188 Alk phos-214 T bili-0.4


  LFTs were normal day prior to procedure


  Pt denies history of liver issues, ETOH, Tylenol, illicit drugs, OTC herbs and


  supplements


- Abdominal pain with nausea, denies emesis


  S/P lap cholecystectomy on 4/15- CT abdomen and pelvis W IV contrast (4/20)


  --> Fluid in the gallbladder fossa. This could be postoperative fluid. This 

does not


  appear to have a capsule. A biloma could have a similar appearance. There is


  intra-and extrahepatic biliary duct dilatation. Free intraperitoneal air, 

retroperitoneal,


  and air within the anterior abdominal wall.  Presumably, this is post 

surgical. 4


  cm lobulated cystic area in the left retroperitoneum likely related to benign 

cystic 


  change such as a lymphocele 





Plan:


MRCP


Monitor LFTs- trending down since post op day 1


Alk phos iso enzymes per attending


Nausea medication PRN


Pain control


GS consult pending 


Further recommendations based on findings of above and clinical course





Pt has been seen and examined by myself and Dr. Hassan and this note is 

written on his behalf











Rosanne Lagos Apr 21, 2018 08:44

## 2018-04-22 VITALS
TEMPERATURE: 98.4 F | RESPIRATION RATE: 18 BRPM | HEART RATE: 56 BPM | SYSTOLIC BLOOD PRESSURE: 97 MMHG | OXYGEN SATURATION: 98 % | DIASTOLIC BLOOD PRESSURE: 56 MMHG

## 2018-04-22 VITALS
HEART RATE: 66 BPM | DIASTOLIC BLOOD PRESSURE: 59 MMHG | RESPIRATION RATE: 18 BRPM | OXYGEN SATURATION: 97 % | SYSTOLIC BLOOD PRESSURE: 119 MMHG | TEMPERATURE: 98.8 F

## 2018-04-22 VITALS
SYSTOLIC BLOOD PRESSURE: 82 MMHG | DIASTOLIC BLOOD PRESSURE: 45 MMHG | OXYGEN SATURATION: 34 % | HEART RATE: 52 BPM | RESPIRATION RATE: 16 BRPM | TEMPERATURE: 97.9 F

## 2018-04-22 VITALS
RESPIRATION RATE: 16 BRPM | DIASTOLIC BLOOD PRESSURE: 53 MMHG | TEMPERATURE: 98 F | OXYGEN SATURATION: 97 % | HEART RATE: 54 BPM | SYSTOLIC BLOOD PRESSURE: 96 MMHG

## 2018-04-22 VITALS
TEMPERATURE: 97.8 F | SYSTOLIC BLOOD PRESSURE: 124 MMHG | OXYGEN SATURATION: 95 % | HEART RATE: 58 BPM | DIASTOLIC BLOOD PRESSURE: 60 MMHG | RESPIRATION RATE: 16 BRPM

## 2018-04-22 VITALS
SYSTOLIC BLOOD PRESSURE: 110 MMHG | RESPIRATION RATE: 15 BRPM | DIASTOLIC BLOOD PRESSURE: 63 MMHG | HEART RATE: 64 BPM | TEMPERATURE: 98.2 F | OXYGEN SATURATION: 97 %

## 2018-04-22 LAB
ALBUMIN SERPL-MCNC: 2.6 GM/DL (ref 3.4–5)
ALP SERPL-CCNC: 220 U/L (ref 45–117)
ALT SERPL-CCNC: 155 U/L (ref 10–53)
AST SERPL-CCNC: 111 U/L (ref 15–37)
BILIRUB SERPL-MCNC: 0.4 MG/DL (ref 0.2–1)
BUN SERPL-MCNC: 3 MG/DL (ref 7–18)
CALCIUM SERPL-MCNC: 8.6 MG/DL (ref 8.5–10.1)
CHLORIDE SERPL-SCNC: 110 MEQ/L (ref 98–107)
CREAT SERPL-MCNC: 0.85 MG/DL (ref 0.5–1)
GFR SERPLBLD BASED ON 1.73 SQ M-ARVRAT: 68 ML/MIN (ref 89–?)
GLUCOSE SERPL-MCNC: 86 MG/DL (ref 74–106)
HCO3 BLD-SCNC: 25 MEQ/L (ref 21–32)
PROT SERPL-MCNC: 5.9 GM/DL (ref 6.4–8.2)
SODIUM SERPL-SCNC: 142 MEQ/L (ref 136–145)

## 2018-04-22 RX ADMIN — TAZOBACTAM SODIUM AND PIPERACILLIN SODIUM SCH MLS/HR: 375; 3 INJECTION, SOLUTION INTRAVENOUS at 14:08

## 2018-04-22 RX ADMIN — STANDARDIZED SENNA CONCENTRATE AND DOCUSATE SODIUM SCH TAB: 8.6; 5 TABLET, FILM COATED ORAL at 08:06

## 2018-04-22 RX ADMIN — TAZOBACTAM SODIUM AND PIPERACILLIN SODIUM SCH MLS/HR: 375; 3 INJECTION, SOLUTION INTRAVENOUS at 02:20

## 2018-04-22 RX ADMIN — PHENYTOIN SODIUM SCH MLS/HR: 50 INJECTION INTRAMUSCULAR; INTRAVENOUS at 09:06

## 2018-04-22 RX ADMIN — Medication SCH ML: at 08:06

## 2018-04-22 RX ADMIN — STANDARDIZED SENNA CONCENTRATE AND DOCUSATE SODIUM SCH TAB: 8.6; 5 TABLET, FILM COATED ORAL at 20:51

## 2018-04-22 RX ADMIN — HYDROMORPHONE HYDROCHLORIDE PRN MG: 2 INJECTION INTRAMUSCULAR; INTRAVENOUS; SUBCUTANEOUS at 08:14

## 2018-04-22 RX ADMIN — TAZOBACTAM SODIUM AND PIPERACILLIN SODIUM SCH MLS/HR: 375; 3 INJECTION, SOLUTION INTRAVENOUS at 20:51

## 2018-04-22 RX ADMIN — ONDANSETRON PRN MG: 2 INJECTION, SOLUTION INTRAMUSCULAR; INTRAVENOUS at 08:20

## 2018-04-22 RX ADMIN — HYDROMORPHONE HYDROCHLORIDE PRN MG: 2 INJECTION INTRAMUSCULAR; INTRAVENOUS; SUBCUTANEOUS at 20:51

## 2018-04-22 RX ADMIN — Medication SCH ML: at 20:52

## 2018-04-22 RX ADMIN — PHENYTOIN SODIUM SCH MLS/HR: 50 INJECTION INTRAMUSCULAR; INTRAVENOUS at 08:06

## 2018-04-22 RX ADMIN — TAZOBACTAM SODIUM AND PIPERACILLIN SODIUM SCH MLS/HR: 375; 3 INJECTION, SOLUTION INTRAVENOUS at 08:05

## 2018-04-22 RX ADMIN — HYDROMORPHONE HYDROCHLORIDE PRN MG: 2 INJECTION INTRAMUSCULAR; INTRAVENOUS; SUBCUTANEOUS at 02:20

## 2018-04-22 RX ADMIN — PANTOPRAZOLE SODIUM SCH MG: 40 INJECTION, POWDER, FOR SOLUTION INTRAVENOUS at 14:08

## 2018-04-22 NOTE — HHI.PR
Subjective


Remarks


in no acute distress.


still with some RUQ pain and some nausea but with no emesis.


afebrile.





Objective


Vitals





Vital Signs








  Date Time  Temp Pulse Resp B/P (MAP) Pulse Ox O2 Delivery O2 Flow Rate FiO2


 


4/22/18 07:32 98.8 66 18 119/59 (79) 97   


 


4/22/18 03:50 97.9 52 16 82/45 (57) 34   


 


4/22/18 02:50   12     


 


4/22/18 00:59 97.8 58 16 124/60 (81) 95   


 


4/22/18 00:28   12     


 


4/21/18 20:13 97.7 57 16 98/61 (73) 97   


 


4/21/18 14:50 98.9 53 18 110/57 (74) 96   


 


4/21/18 13:53  68 18 125/59 (81) 99 Room Air  














I/O      


 


 4/21/18 4/21/18 4/21/18 4/22/18 4/22/18 4/22/18





 07:00 15:00 23:00 07:00 15:00 23:00


 


Intake Total     120 ml 


 


Balance     120 ml 


 


      


 


Intake Oral     120 ml 


 


# Voids     1 








Result Diagram:  


4/21/18 0903                                                                   

             4/22/18 0830





Imaging





Last Impressions








Cholangiopancreatography MRI 4/21/18 0000 Signed





Impressions: 





 Service Date/Time:  Saturday, April 21, 2018 09:54 - CONCLUSION:  1. Status 

post 





 recent cholecystectomy with expected postsurgical changes including small 

amount 





 of fluid and mild inflammatory change in the gallbladder fossa region. 

Followup 





 imaging could be performed if there is clinical concern for bile leak. 2. The 





 common bile duct measures up to 9 mm which can be seen after cholecystectomy. 

3. 





 Lobular fluid collection left retroperitoneum again noted which may represent 

a 





 lymphocele.     Gilbert Schwartz MD 


 


Abdomen/Pelvis CT 4/20/18 1810 Signed





Impressions: 





 Service Date/Time:  Friday, April 20, 2018 19:17 - CONCLUSION:  1. Fluid in 

the 





 gallbladder fossa. This could be postoperative fluid. This does not appear to 





 have a capsule. A biloma could have a similar appearance. There is intra-and 





 extrahepatic biliary duct dilatation. 2. Free intraperitoneal air, 





 retroperitoneal, and air within the anterior abdominal wall. Presumably, this 

is 





 post surgical. This should be correlated with the date of the cholecystectomy. 





 3. 4 cm lobulated cystic area in the left retroperitoneum likely related to 





 benign cystic change such as a lymphocele.     Sang Moran MD 


 


Lower Extremity Ultrasound 4/20/18 0000 Signed





Impressions: 





 Service Date/Time:  Friday, April 20, 2018 19:46 - CONCLUSION: No DVT.     

Sang Moran MD 


 


CT Angiography 4/20/18 0000 Signed





Impressions: 





 Service Date/Time:  Friday, April 20, 2018 19:17 - CONCLUSION:  No acute 





 disease.  No pulmonary embolus is seen.      Sang Moran MD 








Objective Remarks


GENERAL: This is a well-nourished, well-developed patient, in no apparent 

distress.


CARDIOVASCULAR: Regular rate and regular rhythm without murmurs, gallops, or 

rubs. 


RESPIRATORY: Clear to auscultation. Breath sounds equal bilaterally. No wheezes

, rales, or rhonchi.  


GASTROINTESTINAL: Abdomen soft, RUQ tenderness, nondistended. 


Normal, active bowel sounds


MUSCULOSKELETAL: Extremities without clubbing, cyanosis, or edema.


NEURO:  Alert & Oriented x4 to person, place, time, situation.  Moves all ext x4


Medications and IVs





Inpatient Medications


Acetaminophen (Tylenol) 650 mg Q6H  PRN PO FEVER/PAIN SCALE 1 TO 2 Last 

administered on 4/21/18at 17:13;  Start 4/20/18 at 22:45


Bisacodyl (Dulcolax Supp) 10 mg DAILY  PRN RECTAL SEVERE CONSITIPATION;  Start 4 /20/18 at 22:45


Diphenhydramine HCl (Benadryl Inj) 25 mg ONCE  ONCE IV PUSH  Last administered 

on 4/21/18at 19:29;  Start 4/21/18 at 19:30;  Stop 4/21/18 at 19:31;  Status DC


Diphenhydramine HCl (Benadryl) 25 mg ONCE  ONCE PO  Last administered on 4/21/ 18at 19:29;  Start 4/21/18 at 19:30;  Stop 4/21/18 at 19:31;  Status DC


Hydromorphone HCl (Dilaudid Pf Inj) 1 mg Q4H  PRN IV PUSH PAIN 6-10 Last 

administered on 4/22/18at 08:14;  Start 4/21/18 at 09:30


Lactulose (Lactulose Liq) 30 ml DAILY  PRN PO SEVERE CONSITIPATION;  Start 4/20/ 18 at 22:45


Magnesium Hydroxide (Milk Of Magnesia Liq) 30 ml Q12H  PRN PO Mild constipation

;  Start 4/20/18 at 22:45


Metoclopramide HCl (Reglan Inj) 10 mg Q8H  PRN IV PUSH Nausea Last administered 

on 4/21/18at 13:53;  Start 4/21/18 at 13:15


Morphine Sulfate (Morphine Inj) 2 mg Q3H  PRN IV PUSH Pain 6-10 Last 

administered on 4/21/18at 08:12;  Start 4/20/18 at 22:45;  Stop 4/21/18 at 09:03

;  Status DC


Ondansetron HCl (Zofran Inj) 4 mg Q6H  PRN IVP NAUSEA OR VOMITING Last 

administered on 4/22/18at 08:20;  Start 4/20/18 at 22:45


Oxycodone HCl (Roxicodone) 5 mg Q4H  PRN PO PAIN SCALE 3 TO 5 Last administered 

on 4/22/18at 10:02;  Start 4/20/18 at 22:45


Pantoprazole Sodium (Protonix Inj) 40 mg Q24H IV PUSH  Last administered on 4/21 /18at 13:52;  Start 4/21/18 at 14:00


Piperacillin Sod/ Tazobactam Sod 50 ml @  100 mls/hr Q6H IV  Last administered 

on 4/22/18at 08:05;  Start 4/21/18 at 14:00


Senna/Docusate Sodium (Yara-Colace) 1 tab BID PO  Last administered on 4/22/ 18at 08:06;  Start 4/21/18 at 09:00


Sennosides (Senokot) 17.2 mg Q12H  PRN PO Moderate constipation;  Start 4/20/18 

at 22:45


Sodium Chloride (NS Flush) 2 ml BID IV FLUSH ;  Start 4/21/18 at 09:00





A/P


Problem List:  


(1) Intractable pain


ICD Code:  R52 - Pain, unspecified


(2) Elevated LFTs


ICD Code:  R79.89 - Other specified abnormal findings of blood chemistry


(3) HTN (hypertension)


ICD Code:  I10 - Essential (primary) hypertension


Assessment and Plan


A/P  





1.  Intractable Abd Pain:  recent admit 4/15/18 for RUQ pain, +cholelithiasis/

cholecystitis s/p Lap [place] by Dr. Burt, now w/ RUQ pain. 


   CT Abd/Pelvis w/ fluid in gallbladder fossa, likely postop fluid or biloma, +

intra and extra hepatic biliary ductal dilatation.  


   MRCP with small amount of fluid and inflammatory changes in GB fossa-


    started on IV antibiotics- surgery and GI following.


2.  Elevated LFTs:  will monitor the trend- GI and surgery f/u as noted above.


3. elevated BP at the time of presentation- now BP is much better. continue to 

monitor.


4.  DVT Prophylaxis:  SCD/Teds.


Discharge Planning


when cleared by GI and surgery.











Madai Juares MD Apr 22, 2018 11:07

## 2018-04-22 NOTE — HHI.GIFU
Subjective


Remarks


Pt resting in bed


Continued RUQ abdominal pain


Some nausea, denies emesis


Seems to be eating ok 


 (Rosanne Lagos)





Objective


Vitals I&O





Vital Signs








  Date Time  Temp Pulse Resp B/P (MAP) Pulse Ox O2 Delivery O2 Flow Rate FiO2


 


4/22/18 15:07   18     


 


4/22/18 12:38 98.4 56 18 97/56 (70) 98   


 


4/22/18 07:32 98.8 66 18 119/59 (79) 97   


 


4/22/18 03:50 97.9 52 16 82/45 (57) 34   


 


4/22/18 02:50   12     


 


4/22/18 00:59 97.8 58 16 124/60 (81) 95   


 


4/21/18 20:13 97.7 57 16 98/61 (73) 97   














I/O      


 


 4/21/18 4/21/18 4/21/18 4/22/18 4/22/18 4/22/18





 07:00 15:00 23:00 07:00 15:00 23:00


 


Intake Total     120 ml 


 


Balance     120 ml 


 


      


 


Intake Oral     120 ml 


 


# Voids     1 








Laboratory





Laboratory Tests








Test


  4/22/18


08:30


 


Blood Urea Nitrogen 3 


 


Creatinine 0.85 


 


Random Glucose 86 


 


Total Protein 5.9 


 


Albumin 2.6 


 


Calcium Level 8.6 


 


Alkaline Phosphatase 220 


 


Aspartate Amino Transf


(AST/SGOT) 111 


 


 


Alanine Aminotransferase


(ALT/SGPT) 155 


 


 


Total Bilirubin 0.4 


 


Sodium Level 142 


 


Potassium Level 3.4 


 


Chloride Level 110 


 


Carbon Dioxide Level 25.0 


 


Anion Gap 7 


 


Estimat Glomerular Filtration


Rate 68 


 














 Date/Time


Source Procedure


Growth Status


 


 


 4/20/18 18:34


Urine Random Urine Urine Culture - Final


NO GROWTH IN 48 HOURS. Complete








Imaging





Last Impressions








Cholangiopancreatography MRI 4/21/18 0000 Signed





Impressions: 





 Service Date/Time:  Saturday, April 21, 2018 09:54 - CONCLUSION:  1. Status 

post 





 recent cholecystectomy with expected postsurgical changes including small 

amount 





 of fluid and mild inflammatory change in the gallbladder fossa region. 

Followup 





 imaging could be performed if there is clinical concern for bile leak. 2. The 





 common bile duct measures up to 9 mm which can be seen after cholecystectomy. 

3. 





 Lobular fluid collection left retroperitoneum again noted which may represent 

a 





 lymphocele.     Gilbert Schwartz MD 


 


Abdomen/Pelvis CT 4/20/18 1810 Signed





Impressions: 





 Service Date/Time:  Friday, April 20, 2018 19:17 - CONCLUSION:  1. Fluid in 

the 





 gallbladder fossa. This could be postoperative fluid. This does not appear to 





 have a capsule. A biloma could have a similar appearance. There is intra-and 





 extrahepatic biliary duct dilatation. 2. Free intraperitoneal air, 





 retroperitoneal, and air within the anterior abdominal wall. Presumably, this 

is 





 post surgical. This should be correlated with the date of the cholecystectomy. 





 3. 4 cm lobulated cystic area in the left retroperitoneum likely related to 





 benign cystic change such as a lymphocele.     Sang Moran MD 


 


Lower Extremity Ultrasound 4/20/18 0000 Signed





Impressions: 





 Service Date/Time:  Friday, April 20, 2018 19:46 - CONCLUSION: No DVT.     

Sang Moran MD 


 


CT Angiography 4/20/18 0000 Signed





Impressions: 





 Service Date/Time:  Friday, April 20, 2018 19:17 - CONCLUSION:  No acute 





 disease.  No pulmonary embolus is seen.      Sang Moran MD 








Physical Exam


HEENT: Normocephalic; atraumatic


CHEST:  Even/unlabored


CARDIAC:  RRR


ABDOMEN:  Soft, nondistended, RUQ tenderness, bowel sounds active


EXTREMITIES: No clubbing, cyanosis, or edema.


SKIN:  Normal; no rash; no jaundice.


CNS:  No focal deficits; alert and oriented times three.


 (Rosanne Lagos)





Assessment and Plan


Plan


Assessment:


- Elevated LFTs- Elevated during previous post surgical- according to previous


  progress notes, according to surgery this was likely for lap adrián procedure.


  On 4/16 LFTs: AST-252 ALT-321 Alk phos-121 T bili- 0.6


  Today (4/21)  AST:132 ALT-188 Alk phos-214 T bili-0.4


  LFTs were normal day prior to procedure


  Pt denies history of liver issues, ETOH, Tylenol, illicit drugs, OTC herbs and


  supplements


- Abdominal pain with nausea, denies emesis


  S/P lap cholecystectomy on 4/15- CT abdomen and pelvis W IV contrast (4/20)


  --> Fluid in the gallbladder fossa. This could be postoperative fluid. This 

does not


  appear to have a capsule. A biloma could have a similar appearance. There is


  intra-and extrahepatic biliary duct dilatation. Free intraperitoneal air, 

retroperitoneal,


  and air within the anterior abdominal wall.  Presumably, this is post 

surgical. 4


  cm lobulated cystic area in the left retroperitoneum likely related to benign 

cystic 


  change such as a lymphocele 





(4/22) --> S/P MRCP yesterday --> Status post recent cholecystectomy with 

expected 


  postsurgical changes including small amount of fluid and mild inflammatory 

change in 


  the gallbladder fossa region. Followup imaging could be performed if there is 

clinical 


  concern for bile leak. The common bile duct measures up to 9 mm which can be 

seen 


  after cholecystectomy. Lobular fluid collection left retroperitoneum again 

noted which 


  may represent a lymphocele.


  LFTs back up some today, still not as elevated as post cholecystectomy 


  GS following.   





Plan:


Hepatitis panel 


Monitor LFTs


Alk phos iso enzymes per attending


Nausea medication PRN


Pain control


Reglan 


Protonix 


GS following


Further recommendations based on findings of above and clinical course





Pt has been seen and examined by myself and Dr. Li and this note is 

written on his behalf 


 (Rosanne Lagos)


Physician Comments


Patient seen and examined


Agree with above


Continue with current supportive care


Monitor labs


LFT elevation could be secondary to medications or may be related to the recent 

surgery or secondary to infection


If we continue to see arise we will repeat imaging although I doubt the issue 

here is related to the biliary system especially with a normal bilirubin


My concern would be mostly the liver parenchyma because the picture is mostly 

hepatitic


 (Seferino Li MD)











Rosanne Lagos Apr 22, 2018 15:32


Seferino Li MD Apr 22, 2018 23:53

## 2018-04-23 VITALS
HEART RATE: 58 BPM | SYSTOLIC BLOOD PRESSURE: 154 MMHG | OXYGEN SATURATION: 98 % | TEMPERATURE: 98.8 F | DIASTOLIC BLOOD PRESSURE: 72 MMHG | RESPIRATION RATE: 16 BRPM

## 2018-04-23 VITALS
HEART RATE: 59 BPM | SYSTOLIC BLOOD PRESSURE: 112 MMHG | RESPIRATION RATE: 16 BRPM | DIASTOLIC BLOOD PRESSURE: 58 MMHG | OXYGEN SATURATION: 96 % | TEMPERATURE: 97.8 F

## 2018-04-23 VITALS
RESPIRATION RATE: 16 BRPM | TEMPERATURE: 98.6 F | OXYGEN SATURATION: 96 % | DIASTOLIC BLOOD PRESSURE: 70 MMHG | HEART RATE: 56 BPM | SYSTOLIC BLOOD PRESSURE: 132 MMHG

## 2018-04-23 VITALS
SYSTOLIC BLOOD PRESSURE: 102 MMHG | OXYGEN SATURATION: 96 % | HEART RATE: 66 BPM | RESPIRATION RATE: 18 BRPM | TEMPERATURE: 97.6 F | DIASTOLIC BLOOD PRESSURE: 52 MMHG

## 2018-04-23 VITALS — SYSTOLIC BLOOD PRESSURE: 140 MMHG | DIASTOLIC BLOOD PRESSURE: 68 MMHG | HEART RATE: 65 BPM

## 2018-04-23 VITALS
TEMPERATURE: 97.9 F | DIASTOLIC BLOOD PRESSURE: 57 MMHG | RESPIRATION RATE: 16 BRPM | HEART RATE: 50 BPM | OXYGEN SATURATION: 98 % | SYSTOLIC BLOOD PRESSURE: 112 MMHG

## 2018-04-23 VITALS — SYSTOLIC BLOOD PRESSURE: 134 MMHG | DIASTOLIC BLOOD PRESSURE: 69 MMHG | HEART RATE: 53 BPM

## 2018-04-23 VITALS — DIASTOLIC BLOOD PRESSURE: 64 MMHG | SYSTOLIC BLOOD PRESSURE: 134 MMHG

## 2018-04-23 LAB
ALBUMIN SERPL-MCNC: 2.8 GM/DL (ref 3.4–5)
ALP SERPL-CCNC: 195 U/L (ref 45–117)
ALT SERPL-CCNC: 142 U/L (ref 10–53)
AST SERPL-CCNC: 73 U/L (ref 15–37)
BILIRUB SERPL-MCNC: 0.4 MG/DL (ref 0.2–1)
BUN SERPL-MCNC: 2 MG/DL (ref 7–18)
CALCIUM SERPL-MCNC: 9.1 MG/DL (ref 8.5–10.1)
CHLORIDE SERPL-SCNC: 110 MEQ/L (ref 98–107)
CREAT SERPL-MCNC: 0.87 MG/DL (ref 0.5–1)
GFR SERPLBLD BASED ON 1.73 SQ M-ARVRAT: 66 ML/MIN (ref 89–?)
GLUCOSE SERPL-MCNC: 82 MG/DL (ref 74–106)
HCO3 BLD-SCNC: 25.2 MEQ/L (ref 21–32)
PROT SERPL-MCNC: 6.1 GM/DL (ref 6.4–8.2)
SODIUM SERPL-SCNC: 144 MEQ/L (ref 136–145)

## 2018-04-23 RX ADMIN — PANTOPRAZOLE SODIUM SCH MG: 40 INJECTION, POWDER, FOR SOLUTION INTRAVENOUS at 14:25

## 2018-04-23 RX ADMIN — HYDROMORPHONE HYDROCHLORIDE PRN MG: 2 INJECTION INTRAMUSCULAR; INTRAVENOUS; SUBCUTANEOUS at 06:31

## 2018-04-23 RX ADMIN — HYDROMORPHONE HYDROCHLORIDE PRN MG: 2 INJECTION INTRAMUSCULAR; INTRAVENOUS; SUBCUTANEOUS at 12:20

## 2018-04-23 RX ADMIN — TAZOBACTAM SODIUM AND PIPERACILLIN SODIUM SCH MLS/HR: 375; 3 INJECTION, SOLUTION INTRAVENOUS at 02:19

## 2018-04-23 RX ADMIN — TAZOBACTAM SODIUM AND PIPERACILLIN SODIUM SCH MLS/HR: 375; 3 INJECTION, SOLUTION INTRAVENOUS at 14:25

## 2018-04-23 RX ADMIN — Medication SCH ML: at 08:55

## 2018-04-23 RX ADMIN — STANDARDIZED SENNA CONCENTRATE AND DOCUSATE SODIUM SCH TAB: 8.6; 5 TABLET, FILM COATED ORAL at 08:54

## 2018-04-23 RX ADMIN — SODIUM CHLORIDE PRN MG: 900 INJECTION, SOLUTION INTRAVENOUS at 10:05

## 2018-04-23 RX ADMIN — TAZOBACTAM SODIUM AND PIPERACILLIN SODIUM SCH MLS/HR: 375; 3 INJECTION, SOLUTION INTRAVENOUS at 08:55

## 2018-04-23 NOTE — HHI.PR
cc:   JavedHoracio MD


__________________________________________________





Subjective


Subjective Notes


Resting in bed


Pain better but concerned about dizziness and low blood pressure


Not much appetite


Daughter Malou at bedside also concerned about blood pressure





Objective


Vitals/I&O





Vital Signs








  Date Time  Temp Pulse Resp B/P (MAP) Pulse Ox O2 Delivery O2 Flow Rate FiO2


 


4/23/18 10:01    134/64 (87)    


 


4/23/18 09:34 97.9 50 16  98   


 


4/21/18 13:53      Room Air  








Labs





Laboratory Tests








Test


  4/22/18


16:46 4/23/18


05:32


 


Hepatitis A IgM Antibody NONREACTIVE  


 


Hepatitis B Surface Antigen NONREACTIVE  


 


Hepatitis B Core IgM Antibody NONREACTIVE  


 


Hepatitis C IgG Antibody NONREACTIVE  


 


Blood Urea Nitrogen  2 


 


Creatinine  0.87 


 


Random Glucose  82 


 


Total Protein  6.1 


 


Albumin  2.8 


 


Calcium Level  9.1 


 


Alkaline Phosphatase  195 


 


Aspartate Amino Transf


(AST/SGOT) 


  73 


 


 


Alanine Aminotransferase


(ALT/SGPT) 


  142 


 


 


Total Bilirubin  0.4 


 


Sodium Level  144 


 


Potassium Level  3.5 


 


Chloride Level  110 


 


Carbon Dioxide Level  25.2 


 


Anion Gap  9 


 


Estimat Glomerular Filtration


Rate 


  66 


 














 Date/Time


Source Procedure


Growth Status


 


 


 4/20/18 18:34


Urine Random Urine Urine Culture - Final


NO GROWTH IN 48 HOURS. Complete








Cardiovascular:  Regular


Lungs:  Clear


Abdomen:  Other (lap sites c/d/i without any signs of infection; minimally 

tender to palpation ), Post-op tenderness


Extremities:  Other (BLE edema )





A/P


Assessment and Plan


62 year old female s/p lap adrián discharged home last Monday; back with low 

appetite; pain; dizziness 





-Diet as tolerated


-Liver enzymes continue to trend down 


-OOB as tolerated


-Family and patient concerned about dizziness ----Discussed with STEWART Cuadra 


-No acute General Surgery issues at this time





Attending Statement


patient seen at bedside


returned with dizziness and low bp, surgical site appears without infection and 

completely intact


pt does mention a salt disorder and typically eats a lot of salt on a daily 

basis.


await medical work up


no surgical indication





Attestation


The exam, history, and the medical decision-making described in the above note 

were completed with the assistance of the mid-level provider. I reviewed and 

agree with the findings presented.  I attest that I had a face-to-face 

encounter with the patient on the same day, and personally performed and 

documented my assessment and findings in the medical record.











Tana Martinez/First Gillian WEBB Apr 23, 2018 11:43


Horacio Burt MD Apr 27, 2018 10:05

## 2018-04-23 NOTE — HHI.PR
Subjective


Remarks


in no acute distress.


abdominal pain is mild.


says that she feels dizzy this morning.


daughter at the bedside.





Objective


Vitals





Vital Signs








  Date Time  Temp Pulse Resp B/P (MAP) Pulse Ox O2 Delivery O2 Flow Rate FiO2


 


4/23/18 09:34 97.9 50 16 112/57 (75) 98   


 


4/23/18 09:29  53  134/69 (90)    


 


4/23/18 03:33 97.8 59 16 112/58 (76) 96   


 


4/23/18 00:27 97.6 66 18 102/52 (69) 96   


 


4/22/18 20:21 98.2 64 15 110/63 (79) 97   


 


4/22/18 17:30 98.0 54 16 96/53 (67) 97   


 


4/22/18 15:07   18     


 


4/22/18 12:38 98.4 56 18 97/56 (70) 98   














I/O      


 


 4/22/18 4/22/18 4/22/18 4/23/18 4/23/18 4/23/18





 07:00 15:00 23:00 07:00 15:00 23:00


 


Intake Total  270 ml    


 


Balance  270 ml    


 


      


 


Intake Oral  120 ml    


 


IV Total  150 ml    


 


# Voids  1  4  


 


# Bowel Movements    0  








Result Diagram:  


4/21/18 0903                                                                   

             4/23/18 0532





Imaging





Last Impressions








Cholangiopancreatography MRI 4/21/18 0000 Signed





Impressions: 





 Service Date/Time:  Saturday, April 21, 2018 09:54 - CONCLUSION:  1. Status 

post 





 recent cholecystectomy with expected postsurgical changes including small 

amount 





 of fluid and mild inflammatory change in the gallbladder fossa region. 

Followup 





 imaging could be performed if there is clinical concern for bile leak. 2. The 





 common bile duct measures up to 9 mm which can be seen after cholecystectomy. 

3. 





 Lobular fluid collection left retroperitoneum again noted which may represent 

a 





 lymphocele.     Gilbert Schwartz MD 


 


Abdomen/Pelvis CT 4/20/18 1810 Signed





Impressions: 





 Service Date/Time:  Friday, April 20, 2018 19:17 - CONCLUSION:  1. Fluid in 

the 





 gallbladder fossa. This could be postoperative fluid. This does not appear to 





 have a capsule. A biloma could have a similar appearance. There is intra-and 





 extrahepatic biliary duct dilatation. 2. Free intraperitoneal air, 





 retroperitoneal, and air within the anterior abdominal wall. Presumably, this 

is 





 post surgical. This should be correlated with the date of the cholecystectomy. 





 3. 4 cm lobulated cystic area in the left retroperitoneum likely related to 





 benign cystic change such as a lymphocele.     Sang Moran MD 


 


Lower Extremity Ultrasound 4/20/18 0000 Signed





Impressions: 





 Service Date/Time:  Friday, April 20, 2018 19:46 - CONCLUSION: No DVT.     

Sang Moran MD 


 


CT Angiography 4/20/18 0000 Signed





Impressions: 





 Service Date/Time:  Friday, April 20, 2018 19:17 - CONCLUSION:  No acute 





 disease.  No pulmonary embolus is seen.      Sang Moran MD 








Objective Remarks


GENERAL: This is a well-nourished, well-developed patient, in no apparent 

distress.


CARDIOVASCULAR: Regular rate and regular rhythm without murmurs, gallops, or 

rubs. 


RESPIRATORY: Clear to auscultation. Breath sounds equal bilaterally. No wheezes

, rales, or rhonchi.  


GASTROINTESTINAL: Abdomen soft, RUQ tenderness, nondistended. 


Normal, active bowel sounds


MUSCULOSKELETAL: Extremities without clubbing, cyanosis, or edema.


NEURO:  Alert & Oriented x4 to person, place, time, situation.  Moves all ext x4


Medications and IVs





Inpatient Medications


Acetaminophen (Tylenol) 650 mg Q6H  PRN PO FEVER/PAIN SCALE 1 TO 2 Last 

administered on 4/21/18at 17:13;  Start 4/20/18 at 22:45


Bisacodyl (Dulcolax Supp) 10 mg DAILY  PRN RECTAL SEVERE CONSITIPATION;  Start 4 /20/18 at 22:45


Diphenhydramine HCl (Benadryl Inj) 25 mg ONCE  ONCE IV PUSH  Last administered 

on 4/21/18at 19:29;  Start 4/21/18 at 19:30;  Stop 4/21/18 at 19:31;  Status DC


Diphenhydramine HCl (Benadryl) 25 mg ONCE  ONCE PO  Last administered on 4/21/ 18at 19:29;  Start 4/21/18 at 19:30;  Stop 4/21/18 at 19:31;  Status DC


Hydromorphone HCl (Dilaudid Pf Inj) 1 mg Q4H  PRN IV PUSH PAIN 6-10 Last 

administered on 4/23/18at 06:31;  Start 4/21/18 at 09:30


Lactulose (Lactulose Liq) 30 ml DAILY  PRN PO SEVERE CONSITIPATION;  Start 4/20/ 18 at 22:45


Magnesium Hydroxide (Milk Of Magnesia Liq) 30 ml Q12H  PRN PO Mild constipation

;  Start 4/20/18 at 22:45


Metoclopramide HCl (Reglan Inj) 10 mg Q8H  PRN IV PUSH Nausea Last administered 

on 4/23/18at 10:05;  Start 4/21/18 at 13:15


Morphine Sulfate (Morphine Inj) 2 mg Q3H  PRN IV PUSH Pain 6-10 Last 

administered on 4/21/18at 08:12;  Start 4/20/18 at 22:45;  Stop 4/21/18 at 09:03

;  Status DC


Ondansetron HCl (Zofran Inj) 4 mg Q6H  PRN IVP NAUSEA OR VOMITING Last 

administered on 4/22/18at 08:20;  Start 4/20/18 at 22:45


Oxycodone HCl (Roxicodone) 5 mg Q4H  PRN PO PAIN SCALE 3 TO 5 Last administered 

on 4/23/18at 08:55;  Start 4/20/18 at 22:45


Pantoprazole Sodium (Protonix Inj) 40 mg Q24H IV PUSH  Last administered on 4/22 /18at 14:08;  Start 4/21/18 at 14:00


Piperacillin Sod/ Tazobactam Sod 50 ml @  100 mls/hr Q6H IV  Last administered 

on 4/23/18at 08:55;  Start 4/21/18 at 14:00


Potassium Chloride (KCl) 20 meq ONCE  ONCE PO  Last administered on 4/22/18at 11

:34;  Start 4/22/18 at 11:00;  Stop 4/22/18 at 11:19;  Status DC


Senna/Docusate Sodium (Yara-Colace) 1 tab BID PO  Last administered on 4/23/ 18at 08:54;  Start 4/21/18 at 09:00


Sennosides (Senokot) 17.2 mg Q12H  PRN PO Moderate constipation;  Start 4/20/18 

at 22:45


Sodium Chloride (NS Flush) 2 ml BID IV FLUSH  Last administered on 4/23/18at 08:

55;  Start 4/21/18 at 09:00





A/P


Problem List:  


(1) Intractable pain


ICD Code:  R52 - Pain, unspecified


(2) Elevated LFTs


ICD Code:  R79.89 - Other specified abnormal findings of blood chemistry


(3) HTN (hypertension)


ICD Code:  I10 - Essential (primary) hypertension


Assessment and Plan


A/P  





1.  Intractable Abd Pain:  recent admit 4/15/18 for RUQ pain, +cholelithiasis/

cholecystitis s/p Lap Shelley by Dr. Burt, now w/ RUQ pain. 


   CT Abd/Pelvis w/ fluid in gallbladder fossa, likely postop fluid or biloma, +

intra and extra hepatic biliary ductal dilatation.  


   MRCP with small amount of fluid and inflammatory changes in GB fossa-


    started on IV antibiotics- surgery and GI following.


2.  Elevated LFTs:  will monitor the trend- GI and surgery f/u as noted above.


3. elevated BP at the time of presentation- now BP is on low side and the 

patient feels dizzy; start on IV fluid and check for orthostatic BP.


    consult PT.


4.  DVT Prophylaxis:  SCD/Teds.


Discharge Planning


when cleared by GI - seen by PT and BP improves.











Madai Juares MD Apr 23, 2018 10:35

## 2018-04-23 NOTE — HHI.GIFU
Subjective


Remarks


Pt resting in bed, family at bedside.  In NAD.  Says she's not eating much and 

is having abd pain over the area of her incisions.  Has nausea but no vomiting.


 (Georgia Roque)





Objective


Vitals I&O





Vital Signs








  Date Time  Temp Pulse Resp B/P (MAP) Pulse Ox O2 Delivery O2 Flow Rate FiO2


 


4/23/18 03:33 97.8 59 16 112/58 (76) 96   


 


4/23/18 00:27 97.6 66 18 102/52 (69) 96   


 


4/22/18 20:21 98.2 64 15 110/63 (79) 97   


 


4/22/18 17:30 98.0 54 16 96/53 (67) 97   


 


4/22/18 15:07   18     


 


4/22/18 12:38 98.4 56 18 97/56 (70) 98   














I/O      


 


 4/22/18 4/22/18 4/22/18 4/23/18 4/23/18 4/23/18





 07:00 15:00 23:00 07:00 15:00 23:00


 


Intake Total  270 ml    


 


Balance  270 ml    


 


      


 


Intake Oral  120 ml    


 


IV Total  150 ml    


 


# Voids  1  4  


 


# Bowel Movements    0  








Laboratory





Laboratory Tests








Test


  4/22/18


16:46 4/23/18


05:32


 


Hepatitis A IgM Antibody NONREACTIVE  


 


Hepatitis B Surface Antigen NONREACTIVE  


 


Hepatitis B Core IgM Antibody NONREACTIVE  


 


Hepatitis C IgG Antibody NONREACTIVE  


 


Blood Urea Nitrogen  2 


 


Creatinine  0.87 


 


Random Glucose  82 


 


Total Protein  6.1 


 


Albumin  2.8 


 


Calcium Level  9.1 


 


Alkaline Phosphatase  195 


 


Aspartate Amino Transf


(AST/SGOT) 


  73 


 


 


Alanine Aminotransferase


(ALT/SGPT) 


  142 


 


 


Total Bilirubin  0.4 


 


Sodium Level  144 


 


Potassium Level  3.5 


 


Chloride Level  110 


 


Carbon Dioxide Level  25.2 


 


Anion Gap  9 


 


Estimat Glomerular Filtration


Rate 


  66 


 














 Date/Time


Source Procedure


Growth Status


 


 


 4/20/18 18:34


Urine Random Urine Urine Culture - Final


NO GROWTH IN 48 HOURS. Complete








Imaging





Last Impressions








Cholangiopancreatography MRI 4/21/18 0000 Signed





Impressions: 





 Service Date/Time:  Saturday, April 21, 2018 09:54 - CONCLUSION:  1. Status 

post 





 recent cholecystectomy with expected postsurgical changes including small 

amount 





 of fluid and mild inflammatory change in the gallbladder fossa region. 

Followup 





 imaging could be performed if there is clinical concern for bile leak. 2. The 





 common bile duct measures up to 9 mm which can be seen after cholecystectomy. 

3. 





 Lobular fluid collection left retroperitoneum again noted which may represent 

a 





 lymphocele.     Gilbert Schwartz MD 


 


Abdomen/Pelvis CT 4/20/18 1810 Signed





Impressions: 





 Service Date/Time:  Friday, April 20, 2018 19:17 - CONCLUSION:  1. Fluid in 

the 





 gallbladder fossa. This could be postoperative fluid. This does not appear to 





 have a capsule. A biloma could have a similar appearance. There is intra-and 





 extrahepatic biliary duct dilatation. 2. Free intraperitoneal air, 





 retroperitoneal, and air within the anterior abdominal wall. Presumably, this 

is 





 post surgical. This should be correlated with the date of the cholecystectomy. 





 3. 4 cm lobulated cystic area in the left retroperitoneum likely related to 





 benign cystic change such as a lymphocele.     Sang Moran MD 


 


Lower Extremity Ultrasound 4/20/18 0000 Signed





Impressions: 





 Service Date/Time:  Friday, April 20, 2018 19:46 - CONCLUSION: No DVT.     

Sang Moran MD 


 


CT Angiography 4/20/18 0000 Signed





Impressions: 





 Service Date/Time:  Friday, April 20, 2018 19:17 - CONCLUSION:  No acute 





 disease.  No pulmonary embolus is seen.      Sang Moran MD 








Physical Exam


HEENT: Normocephalic; atraumatic


CHEST:  Even/unlabored


CARDIAC:  RRR


ABDOMEN:  Soft, nondistended, RUQ tenderness, bowel sounds active


EXTREMITIES: No clubbing, cyanosis, or edema.


SKIN:  Normal; no rash; no jaundice.


CNS:  No focal deficits; alert and oriented times three.


 (Georgia Roque [facility])





Assessment and Plan


Plan


Assessment:


- Elevated LFTs- Elevated during previous post surgical- according to previous


  progress notes, according to surgery this was likely for lap adrián procedure.


  On 4/16 LFTs: AST-252 ALT-321 Alk phos-121 T bili- 0.6


  Today (4/21)  AST:132 ALT-188 Alk phos-214 T bili-0.4


  LFTs were normal day prior to procedure


  Pt denies history of liver issues, ETOH, Tylenol, illicit drugs, OTC herbs and


  supplements


- Abdominal pain with nausea, denies emesis


  S/P lap cholecystectomy on 4/15- CT abdomen and pelvis W IV contrast (4/20)


  --> Fluid in the gallbladder fossa. This could be postoperative fluid. This 

does not


  appear to have a capsule. A biloma could have a similar appearance. There is


  intra-and extrahepatic biliary duct dilatation. Free intraperitoneal air, 

retroperitoneal,


  and air within the anterior abdominal wall.  Presumably, this is post 

surgical. 4


  cm lobulated cystic area in the left retroperitoneum likely related to benign 

cystic 


  change such as a lymphocele 





(4/22) --> S/P MRCP yesterday --> Status post recent cholecystectomy with 

expected 


  postsurgical changes including small amount of fluid and mild inflammatory 

change in 


  the gallbladder fossa region. Followup imaging could be performed if there is 

clinical 


  concern for bile leak. The common bile duct measures up to 9 mm which can be 

seen 


  after cholecystectomy. Lobular fluid collection left retroperitoneum again 

noted which 


  may represent a lymphocele.


  LFTs back up some today, still not as elevated as post cholecystectomy 


  GS following.   


4/23/18  LFTs subtly trending down.  hep panel neg.  iso enzymes pending.  c/o 

RUQ pain, nausea.


  looks comfortable though.  GS following





Plan:


Monitor LFTs


await ALP iso enzymes


Nausea medication PRN


Reglan 


Protonix 


supportive care


ok for d/c from GI standpoint


f/u with GI in 2 weeks


CBC and CMP in 2 weeks





Pt has been seen and examined by myself and Dr. Li and this note is 

written on his behalf 


 (Georgia Roque)


Physician Comments


Patient seen and examined


Agree with above


Continue with current supportive care


Monitor lab


Follow-up with GI post discharge


 (Seferino Li MD)











Georgia Roque Apr 23, 2018 09:22


Seferino Li MD Apr 23, 2018 23:01

## 2018-04-23 NOTE — HHI.DCPOC
Discharge Care Plan


Diagnosis:  


(1) Elevated LFTs


(2) Bile duct abnormality


Goals to Promote Your Health


* To prevent worsening of your condition and complications


* To maintain your health at the optimal level


Directions to Meet Your Goals


*** Take your medications as prescribed


*** Follow your dietary instruction


*** Follow activity as directed








*** Keep your appointments as scheduled


*** Take your immunizations and boosters as scheduled


*** If your symptoms worsen call your PCP, if no PCP go to Urgent Care Center 

or Emergency Room***


*** Smoking is Dangerous to Your Health. Avoid second hand smoke***


***Call the 24-hour hour crisis hotline for domestic abuse at 1-925.803.7453***











Elissa Wilson PA-C Apr 23, 2018 16:40

## 2018-04-26 LAB
ALK PHOS BONE (ISOENZYMES): 40 % (ref 28–66)
ALK PHOS LIVER (ISOENZYME): 60 % (ref 25–69)
ALK PHOS PLACENTAL ISOENZYME: 0 %
ALP INTEST CFR SERPL: 0 % (ref 1–24)